# Patient Record
Sex: FEMALE | Race: WHITE | NOT HISPANIC OR LATINO | Employment: OTHER | URBAN - METROPOLITAN AREA
[De-identification: names, ages, dates, MRNs, and addresses within clinical notes are randomized per-mention and may not be internally consistent; named-entity substitution may affect disease eponyms.]

---

## 2017-01-31 ENCOUNTER — HISTORICAL (OUTPATIENT)
Dept: RADIOLOGY | Facility: HOSPITAL | Age: 67
End: 2017-01-31

## 2017-02-02 ENCOUNTER — HISTORICAL (OUTPATIENT)
Dept: ADMINISTRATIVE | Facility: HOSPITAL | Age: 67
End: 2017-02-02

## 2017-12-28 ENCOUNTER — HISTORICAL (OUTPATIENT)
Dept: HEMATOLOGY/ONCOLOGY | Facility: CLINIC | Age: 67
End: 2017-12-28

## 2017-12-28 LAB
ABS NEUT (OLG): 2.16 X10(3)/MCL (ref 2.1–9.2)
ALBUMIN SERPL-MCNC: 4 GM/DL (ref 3.4–5)
ALBUMIN/GLOB SERPL: 1.2 RATIO (ref 1.1–2)
ALP SERPL-CCNC: 70 UNIT/L (ref 38–126)
ALT SERPL-CCNC: 28 UNIT/L (ref 12–78)
AST SERPL-CCNC: 22 UNIT/L (ref 15–37)
BASOPHILS # BLD AUTO: 0 X10(3)/MCL (ref 0–0.2)
BASOPHILS NFR BLD AUTO: 0.9 %
BILIRUB SERPL-MCNC: 1.1 MG/DL (ref 0.2–1)
BILIRUBIN DIRECT+TOT PNL SERPL-MCNC: 0.2 MG/DL (ref 0–0.5)
BILIRUBIN DIRECT+TOT PNL SERPL-MCNC: 0.9 MG/DL (ref 0–0.8)
BUN SERPL-MCNC: 17 MG/DL (ref 7–18)
CALCIUM SERPL-MCNC: 9.3 MG/DL (ref 8.5–10.1)
CHLORIDE SERPL-SCNC: 103 MMOL/L (ref 98–107)
CO2 SERPL-SCNC: 29 MMOL/L (ref 21–32)
CREAT SERPL-MCNC: 0.72 MG/DL (ref 0.55–1.02)
EOSINOPHIL # BLD AUTO: 0.1 X10(3)/MCL (ref 0–0.9)
EOSINOPHIL NFR BLD AUTO: 2.2 %
ERYTHROCYTE [DISTWIDTH] IN BLOOD BY AUTOMATED COUNT: 13.4 % (ref 11.5–17)
GLOBULIN SER-MCNC: 3.4 GM/DL (ref 2.4–3.5)
GLUCOSE SERPL-MCNC: 88 MG/DL (ref 74–106)
HCT VFR BLD AUTO: 39.3 % (ref 37–47)
HGB BLD-MCNC: 12.9 GM/DL (ref 12–16)
LYMPHOCYTES # BLD AUTO: 1.8 X10(3)/MCL (ref 0.6–4.6)
LYMPHOCYTES NFR BLD AUTO: 39.5 %
MCH RBC QN AUTO: 28.2 PG (ref 27–31)
MCHC RBC AUTO-ENTMCNC: 32.8 GM/DL (ref 33–36)
MCV RBC AUTO: 85.8 FL (ref 80–94)
MONOCYTES # BLD AUTO: 0.4 X10(3)/MCL (ref 0.1–1.3)
MONOCYTES NFR BLD AUTO: 9.5 %
NEUTROPHILS # BLD AUTO: 2.2 X10(3)/MCL (ref 2.1–9.2)
NEUTROPHILS NFR BLD AUTO: 47.9 %
PLATELET # BLD AUTO: 156 X10(3)/MCL (ref 130–400)
PMV BLD AUTO: 8.5 FL (ref 9.4–12.4)
POTASSIUM SERPL-SCNC: 4 MMOL/L (ref 3.5–5.1)
PROT SERPL-MCNC: 7.4 GM/DL (ref 6.4–8.2)
RBC # BLD AUTO: 4.58 X10(6)/MCL (ref 4.2–5.4)
SODIUM SERPL-SCNC: 140 MMOL/L (ref 136–145)
WBC # SPEC AUTO: 4.5 X10(3)/MCL (ref 4.5–11.5)

## 2018-03-01 ENCOUNTER — HISTORICAL (OUTPATIENT)
Dept: ADMINISTRATIVE | Facility: HOSPITAL | Age: 68
End: 2018-03-01

## 2018-03-01 LAB
CHOLEST SERPL-MCNC: 233 MG/DL (ref 0–200)
CHOLEST/HDLC SERPL: 3.9 {RATIO} (ref 0–4)
EST. AVERAGE GLUCOSE BLD GHB EST-MCNC: 105 MG/DL
HBA1C MFR BLD: 5.3 % (ref 4.2–6.3)
HDLC SERPL-MCNC: 60 MG/DL (ref 35–60)
LDLC SERPL CALC-MCNC: 152 MG/DL (ref 0–129)
TRIGL SERPL-MCNC: 107 MG/DL (ref 30–150)
VLDLC SERPL CALC-MCNC: 21 MG/DL

## 2018-05-18 LAB — CRC RECOMMENDATION EXT: NORMAL

## 2019-02-14 ENCOUNTER — HISTORICAL (OUTPATIENT)
Dept: HEMATOLOGY/ONCOLOGY | Facility: CLINIC | Age: 69
End: 2019-02-14

## 2019-02-14 LAB
ABS NEUT (OLG): 2.75 X10(3)/MCL (ref 2.1–9.2)
ALBUMIN SERPL-MCNC: 3.7 GM/DL (ref 3.4–5)
ALBUMIN/GLOB SERPL: 1 RATIO (ref 1.1–2)
ALP SERPL-CCNC: 99 UNIT/L (ref 38–126)
ALT SERPL-CCNC: 44 UNIT/L (ref 12–78)
AST SERPL-CCNC: 26 UNIT/L (ref 15–37)
BASOPHILS # BLD AUTO: 0 X10(3)/MCL (ref 0–0.2)
BASOPHILS NFR BLD AUTO: 0.9 %
BILIRUB SERPL-MCNC: 0.8 MG/DL (ref 0.2–1)
BILIRUBIN DIRECT+TOT PNL SERPL-MCNC: 0.1 MG/DL (ref 0–0.5)
BILIRUBIN DIRECT+TOT PNL SERPL-MCNC: 0.7 MG/DL (ref 0–0.8)
BUN SERPL-MCNC: 17 MG/DL (ref 7–18)
CALCIUM SERPL-MCNC: 8.7 MG/DL (ref 8.5–10.1)
CHLORIDE SERPL-SCNC: 106 MMOL/L (ref 98–107)
CO2 SERPL-SCNC: 29 MMOL/L (ref 21–32)
CREAT SERPL-MCNC: 0.73 MG/DL (ref 0.55–1.02)
EOSINOPHIL # BLD AUTO: 0.1 X10(3)/MCL (ref 0–0.9)
EOSINOPHIL NFR BLD AUTO: 2.4 %
ERYTHROCYTE [DISTWIDTH] IN BLOOD BY AUTOMATED COUNT: 13 % (ref 11.5–17)
GLOBULIN SER-MCNC: 3.6 GM/DL (ref 2.4–3.5)
GLUCOSE SERPL-MCNC: 92 MG/DL (ref 74–106)
HCT VFR BLD AUTO: 39.9 % (ref 37–47)
HGB BLD-MCNC: 12.5 GM/DL (ref 12–16)
LYMPHOCYTES # BLD AUTO: 2 X10(3)/MCL (ref 0.6–4.6)
LYMPHOCYTES NFR BLD AUTO: 36.8 %
MCH RBC QN AUTO: 27.3 PG (ref 27–31)
MCHC RBC AUTO-ENTMCNC: 31.3 GM/DL (ref 33–36)
MCV RBC AUTO: 87.1 FL (ref 80–94)
MONOCYTES # BLD AUTO: 0.5 X10(3)/MCL (ref 0.1–1.3)
MONOCYTES NFR BLD AUTO: 8.6 %
NEUTROPHILS # BLD AUTO: 2.8 X10(3)/MCL (ref 2.1–9.2)
NEUTROPHILS NFR BLD AUTO: 51.1 %
PLATELET # BLD AUTO: 189 X10(3)/MCL (ref 130–400)
PMV BLD AUTO: 8.8 FL (ref 9.4–12.4)
POTASSIUM SERPL-SCNC: 4.3 MMOL/L (ref 3.5–5.1)
PROT SERPL-MCNC: 7.3 GM/DL (ref 6.4–8.2)
RBC # BLD AUTO: 4.58 X10(6)/MCL (ref 4.2–5.4)
SODIUM SERPL-SCNC: 141 MMOL/L (ref 136–145)
WBC # SPEC AUTO: 5.4 X10(3)/MCL (ref 4.5–11.5)

## 2019-06-27 ENCOUNTER — HISTORICAL (OUTPATIENT)
Dept: ADMINISTRATIVE | Facility: HOSPITAL | Age: 69
End: 2019-06-27

## 2019-06-27 LAB
CHOLEST SERPL-MCNC: 277 MG/DL (ref 0–200)
CHOLEST/HDLC SERPL: 4 {RATIO} (ref 0–4)
HDLC SERPL-MCNC: 69 MG/DL (ref 35–60)
LDLC SERPL CALC-MCNC: 180 MG/DL (ref 0–129)
TRIGL SERPL-MCNC: 142 MG/DL (ref 30–150)
VLDLC SERPL CALC-MCNC: 28 MG/DL

## 2020-06-03 ENCOUNTER — HISTORICAL (OUTPATIENT)
Dept: ADMINISTRATIVE | Facility: HOSPITAL | Age: 70
End: 2020-06-03

## 2020-07-14 ENCOUNTER — HISTORICAL (OUTPATIENT)
Dept: ADMINISTRATIVE | Facility: HOSPITAL | Age: 70
End: 2020-07-14

## 2020-07-14 LAB
ALBUMIN SERPL-MCNC: 4.3 GM/DL (ref 3.4–4.8)
ALBUMIN/GLOB SERPL: 1.3 RATIO (ref 1.1–2)
ALP SERPL-CCNC: 94 UNIT/L (ref 40–150)
ALT SERPL-CCNC: 25 UNIT/L (ref 0–55)
AST SERPL-CCNC: 20 UNIT/L (ref 5–34)
BILIRUB SERPL-MCNC: 1.3 MG/DL
BILIRUBIN DIRECT+TOT PNL SERPL-MCNC: 0.3 MG/DL (ref 0–0.5)
BILIRUBIN DIRECT+TOT PNL SERPL-MCNC: 1 MG/DL (ref 0–0.8)
BUN SERPL-MCNC: 12.5 MG/DL (ref 9.8–20.1)
CALCIUM SERPL-MCNC: 9.7 MG/DL (ref 8.4–10.2)
CHLORIDE SERPL-SCNC: 102 MMOL/L (ref 98–107)
CHOLEST SERPL-MCNC: 281 MG/DL
CHOLEST/HDLC SERPL: 5 {RATIO} (ref 0–5)
CO2 SERPL-SCNC: 30 MMOL/L (ref 23–31)
CREAT SERPL-MCNC: 0.78 MG/DL (ref 0.55–1.02)
GLOBULIN SER-MCNC: 3.4 GM/DL (ref 2.4–3.5)
GLUCOSE SERPL-MCNC: 98 MG/DL (ref 82–115)
HDLC SERPL-MCNC: 58 MG/DL (ref 35–60)
LDLC SERPL CALC-MCNC: 191 MG/DL (ref 50–140)
POTASSIUM SERPL-SCNC: 4.4 MMOL/L (ref 3.5–5.1)
PROT SERPL-MCNC: 7.7 GM/DL (ref 5.8–7.6)
SODIUM SERPL-SCNC: 141 MMOL/L (ref 136–145)
TRIGL SERPL-MCNC: 160 MG/DL (ref 37–140)
VLDLC SERPL CALC-MCNC: 32 MG/DL

## 2020-07-30 ENCOUNTER — HISTORICAL (OUTPATIENT)
Dept: RADIOLOGY | Facility: HOSPITAL | Age: 70
End: 2020-07-30

## 2020-07-30 LAB — BMD RECOMMENDATION EXT: NORMAL

## 2020-08-13 ENCOUNTER — HISTORICAL (OUTPATIENT)
Dept: RADIOLOGY | Facility: HOSPITAL | Age: 70
End: 2020-08-13

## 2020-08-18 ENCOUNTER — HISTORICAL (OUTPATIENT)
Dept: ADMINISTRATIVE | Facility: HOSPITAL | Age: 70
End: 2020-08-18

## 2020-08-18 LAB
ABS NEUT (OLG): 3.63 X10(3)/MCL (ref 2.1–9.2)
ALBUMIN SERPL-MCNC: 4.2 GM/DL (ref 3.4–4.8)
ALBUMIN/GLOB SERPL: 1.3 RATIO (ref 1.1–2)
ALP SERPL-CCNC: 101 UNIT/L (ref 40–150)
ALT SERPL-CCNC: 29 UNIT/L (ref 0–55)
AST SERPL-CCNC: 28 UNIT/L (ref 5–34)
BASOPHILS # BLD AUTO: 0 X10(3)/MCL (ref 0–0.2)
BASOPHILS NFR BLD AUTO: 0.6 %
BILIRUB SERPL-MCNC: 1.4 MG/DL
BILIRUBIN DIRECT+TOT PNL SERPL-MCNC: 0.5 MG/DL (ref 0–0.5)
BILIRUBIN DIRECT+TOT PNL SERPL-MCNC: 0.9 MG/DL (ref 0–0.8)
BUN SERPL-MCNC: 12.9 MG/DL (ref 9.8–20.1)
CALCIUM SERPL-MCNC: 9 MG/DL (ref 8.4–10.2)
CHLORIDE SERPL-SCNC: 103 MMOL/L (ref 98–107)
CO2 SERPL-SCNC: 27 MMOL/L (ref 23–31)
CREAT SERPL-MCNC: 0.78 MG/DL (ref 0.55–1.02)
EOSINOPHIL # BLD AUTO: 0.1 X10(3)/MCL (ref 0–0.9)
EOSINOPHIL NFR BLD AUTO: 1.7 %
ERYTHROCYTE [DISTWIDTH] IN BLOOD BY AUTOMATED COUNT: 13.2 % (ref 11.5–17)
GLOBULIN SER-MCNC: 3.2 GM/DL (ref 2.4–3.5)
GLUCOSE SERPL-MCNC: 93 MG/DL (ref 82–115)
HCT VFR BLD AUTO: 40.8 % (ref 37–47)
HGB BLD-MCNC: 12.8 GM/DL (ref 12–16)
LYMPHOCYTES # BLD AUTO: 2.3 X10(3)/MCL (ref 0.6–4.6)
LYMPHOCYTES NFR BLD AUTO: 35.2 %
MCH RBC QN AUTO: 26.8 PG (ref 27–31)
MCHC RBC AUTO-ENTMCNC: 31.4 GM/DL (ref 33–36)
MCV RBC AUTO: 85.5 FL (ref 80–94)
MONOCYTES # BLD AUTO: 0.5 X10(3)/MCL (ref 0.1–1.3)
MONOCYTES NFR BLD AUTO: 7.2 %
NEUTROPHILS # BLD AUTO: 3.6 X10(3)/MCL (ref 2.1–9.2)
NEUTROPHILS NFR BLD AUTO: 55.1 %
PLATELET # BLD AUTO: 189 X10(3)/MCL (ref 130–400)
PMV BLD AUTO: 8.8 FL (ref 9.4–12.4)
POTASSIUM SERPL-SCNC: 4.5 MMOL/L (ref 3.5–5.1)
PROT SERPL-MCNC: 7.4 GM/DL (ref 5.8–7.6)
RBC # BLD AUTO: 4.77 X10(6)/MCL (ref 4.2–5.4)
SODIUM SERPL-SCNC: 140 MMOL/L (ref 136–145)
WBC # SPEC AUTO: 6.6 X10(3)/MCL (ref 4.5–11.5)

## 2020-10-27 ENCOUNTER — HISTORICAL (OUTPATIENT)
Dept: ADMINISTRATIVE | Facility: HOSPITAL | Age: 70
End: 2020-10-27

## 2020-10-27 LAB
ALBUMIN SERPL-MCNC: 4.2 GM/DL (ref 3.4–4.8)
ALP SERPL-CCNC: 72 UNIT/L (ref 40–150)
ALT SERPL-CCNC: 18 UNIT/L (ref 0–55)
AST SERPL-CCNC: 22 UNIT/L (ref 5–34)
BILIRUB SERPL-MCNC: 1.3 MG/DL
BILIRUBIN DIRECT+TOT PNL SERPL-MCNC: 0.5 MG/DL (ref 0–0.5)
BILIRUBIN DIRECT+TOT PNL SERPL-MCNC: 0.8 MG/DL (ref 0–0.8)
CHOLEST SERPL-MCNC: 141 MG/DL
CHOLEST/HDLC SERPL: 2 {RATIO} (ref 0–5)
HDLC SERPL-MCNC: 62 MG/DL (ref 35–60)
LDLC SERPL CALC-MCNC: 61 MG/DL (ref 50–140)
LIVER PROFILE INTERP: NORMAL
PROT SERPL-MCNC: 7.3 GM/DL (ref 5.8–7.6)
TRIGL SERPL-MCNC: 92 MG/DL (ref 37–140)
VLDLC SERPL CALC-MCNC: 18 MG/DL

## 2021-05-17 ENCOUNTER — HISTORICAL (OUTPATIENT)
Dept: ADMINISTRATIVE | Facility: HOSPITAL | Age: 71
End: 2021-05-17

## 2021-07-15 ENCOUNTER — HISTORICAL (OUTPATIENT)
Dept: ADMINISTRATIVE | Facility: HOSPITAL | Age: 71
End: 2021-07-15

## 2021-07-15 LAB
ALBUMIN SERPL-MCNC: 3.7 GM/DL (ref 3.4–4.8)
ALBUMIN/GLOB SERPL: 1.2 RATIO (ref 1.1–2)
ALP SERPL-CCNC: 104 UNIT/L (ref 40–150)
ALT SERPL-CCNC: 19 UNIT/L (ref 0–55)
AST SERPL-CCNC: 21 UNIT/L (ref 5–34)
BILIRUB SERPL-MCNC: 1.2 MG/DL
BILIRUBIN DIRECT+TOT PNL SERPL-MCNC: 0.4 MG/DL (ref 0–0.5)
BILIRUBIN DIRECT+TOT PNL SERPL-MCNC: 0.8 MG/DL (ref 0–0.8)
BUN SERPL-MCNC: 17.8 MG/DL (ref 9.8–20.1)
CALCIUM SERPL-MCNC: 9.2 MG/DL (ref 8.4–10.2)
CHLORIDE SERPL-SCNC: 103 MMOL/L (ref 98–107)
CHOLEST SERPL-MCNC: 160 MG/DL
CHOLEST/HDLC SERPL: 2 {RATIO} (ref 0–5)
CO2 SERPL-SCNC: 31 MMOL/L (ref 23–31)
CREAT SERPL-MCNC: 0.79 MG/DL (ref 0.55–1.02)
GLOBULIN SER-MCNC: 3.2 GM/DL (ref 2.4–3.5)
GLUCOSE SERPL-MCNC: 90 MG/DL (ref 82–115)
HDLC SERPL-MCNC: 68 MG/DL (ref 35–60)
LDLC SERPL CALC-MCNC: 78 MG/DL (ref 50–140)
POTASSIUM SERPL-SCNC: 4.5 MMOL/L (ref 3.5–5.1)
PROT SERPL-MCNC: 6.9 GM/DL (ref 5.8–7.6)
SODIUM SERPL-SCNC: 144 MMOL/L (ref 136–145)
TRIGL SERPL-MCNC: 72 MG/DL (ref 37–140)
VLDLC SERPL CALC-MCNC: 14 MG/DL

## 2021-08-11 ENCOUNTER — HISTORICAL (OUTPATIENT)
Dept: HEMATOLOGY/ONCOLOGY | Facility: CLINIC | Age: 71
End: 2021-08-11

## 2021-08-11 LAB
ABS NEUT (OLG): 3.06 X10(3)/MCL (ref 2.1–9.2)
ALBUMIN SERPL-MCNC: 4.2 GM/DL (ref 3.4–4.8)
ALBUMIN/GLOB SERPL: 1.4 RATIO (ref 1.1–2)
ALP SERPL-CCNC: 125 UNIT/L (ref 40–150)
ALT SERPL-CCNC: 27 UNIT/L (ref 0–55)
AST SERPL-CCNC: 24 UNIT/L (ref 5–34)
BASOPHILS # BLD AUTO: 0 X10(3)/MCL (ref 0–0.2)
BASOPHILS NFR BLD AUTO: 0.8 %
BILIRUB SERPL-MCNC: 1.2 MG/DL
BILIRUBIN DIRECT+TOT PNL SERPL-MCNC: 0.4 MG/DL (ref 0–0.5)
BILIRUBIN DIRECT+TOT PNL SERPL-MCNC: 0.8 MG/DL (ref 0–0.8)
BUN SERPL-MCNC: 17.8 MG/DL (ref 9.8–20.1)
CALCIUM SERPL-MCNC: 9.9 MG/DL (ref 8.4–10.2)
CHLORIDE SERPL-SCNC: 102 MMOL/L (ref 98–107)
CO2 SERPL-SCNC: 29 MMOL/L (ref 23–31)
CREAT SERPL-MCNC: 0.76 MG/DL (ref 0.55–1.02)
EOSINOPHIL # BLD AUTO: 0.1 X10(3)/MCL (ref 0–0.9)
EOSINOPHIL NFR BLD AUTO: 2.3 %
ERYTHROCYTE [DISTWIDTH] IN BLOOD BY AUTOMATED COUNT: 13.2 % (ref 11.5–17)
GLOBULIN SER-MCNC: 3.1 GM/DL (ref 2.4–3.5)
GLUCOSE SERPL-MCNC: 68 MG/DL (ref 82–115)
HCT VFR BLD AUTO: 39.7 % (ref 37–47)
HGB BLD-MCNC: 12.5 GM/DL (ref 12–16)
LYMPHOCYTES # BLD AUTO: 2.2 X10(3)/MCL (ref 0.6–4.6)
LYMPHOCYTES NFR BLD AUTO: 36.5 %
MCH RBC QN AUTO: 27.1 PG (ref 27–31)
MCHC RBC AUTO-ENTMCNC: 31.5 GM/DL (ref 33–36)
MCV RBC AUTO: 85.9 FL (ref 80–94)
MONOCYTES # BLD AUTO: 0.7 X10(3)/MCL (ref 0.1–1.3)
MONOCYTES NFR BLD AUTO: 10.7 %
NEUTROPHILS # BLD AUTO: 3.1 X10(3)/MCL (ref 2.1–9.2)
NEUTROPHILS NFR BLD AUTO: 49.5 %
PLATELET # BLD AUTO: 170 X10(3)/MCL (ref 130–400)
PMV BLD AUTO: 8.6 FL (ref 9.4–12.4)
POTASSIUM SERPL-SCNC: 4.7 MMOL/L (ref 3.5–5.1)
PROT SERPL-MCNC: 7.3 GM/DL (ref 5.8–7.6)
RBC # BLD AUTO: 4.62 X10(6)/MCL (ref 4.2–5.4)
SODIUM SERPL-SCNC: 143 MMOL/L (ref 136–145)
WBC # SPEC AUTO: 6.2 X10(3)/MCL (ref 4.5–11.5)

## 2021-08-16 ENCOUNTER — HISTORICAL (OUTPATIENT)
Dept: RADIOLOGY | Facility: HOSPITAL | Age: 71
End: 2021-08-16

## 2022-04-10 ENCOUNTER — HISTORICAL (OUTPATIENT)
Dept: ADMINISTRATIVE | Facility: HOSPITAL | Age: 72
End: 2022-04-10
Payer: MEDICARE

## 2022-04-27 VITALS
HEIGHT: 60 IN | SYSTOLIC BLOOD PRESSURE: 128 MMHG | DIASTOLIC BLOOD PRESSURE: 82 MMHG | WEIGHT: 189 LBS | BODY MASS INDEX: 37.11 KG/M2 | OXYGEN SATURATION: 96 %

## 2022-05-03 NOTE — HISTORICAL [facility]
This is a historical note converted from Adi. Formatting and pictures may have been removed.  Please reference Adi for original formatting and attached multimedia. Chief Complaint  Fell about 1 month ago on her left knee and the pain has incresed since then.  History of Present Illness  71-year-old female presents office today for evaluation of her left knee pain. ?This is been?present for nearly 1 month. ?She states that she fell but cannot?quite remember?how.? Her pain is medial sided. ?It is worse with?deep flexion?and bending.? She denies any locking, catching, giving way episodes. ?She denies any swelling.? She has tried oral prednisone with no relief?but has seen some relief with?diclofenac.  Review of Systems  Systemic: No fever, no chills, and no recent weight change.  Head: No headache - frequent.  Eyes: No vision problems.  Otolarnygeal: No hearing loss, no earache, no epistaxis, no hoarseness, and no tooth pain. Gums normal.  Cardiovascular: No chest pain or discomfort and no palpitations.  Pulmonary: No pulmonary symptoms - no dyspnea, no shortness of breath  Gastrointestinal: Appetite not decreased. No dysphagia and no constant eructation. No nausea, no vomiting, no abdominal pain, no hematochezia.  Genitourinary: No genitourinary symptoms - No urinary hesitancy. No urinary loss of control - no burning sensation during urination.  Musculoskeletal: No calf muscle cramps and no localized soft tissue swelling  Neurological: No fainting and no convulsions.  Psychological: no depression.  Skin: No rash.  Physical Exam  Vitals & Measurements  T:?36.1? ?C (Oral)? HR:?78(Peripheral)? BP:?136/85?  HT:?153.00?cm? WT:?83.640?kg? BMI:?35.73?  PHYSICAL FINDINGS  Cardiovascular:  Arterial Pulses: Posterior tibialis pulses were normal. Dorsalis pedis pulses were normal left.  Musculoskeletal System:  Thigh:  ?Thigh: Thigh showed quadriceps atrophy.  Knee:  left?Knee:  Grade effusion 1  Genu varum. Patella  demonstrated crepitus. Anteromedial aspect was tender on palpation. Medial aspect was tender on palpation. Medial collateral ligament was tender on palpation. Active motion.  left?Knee:  Knee Motion: Value  Active flexion?120 degrees  Active extension?5 degrees  Pain was elicited by flexion. No erythema. No warmth. No medial instability. No lateral instability. No one plane medial (straight) instability. No one plane lateral (straight) instability. A Lachman test did not demonstrate one plane anterior instability.  Neurological:  Gait And Stance: A left-sided antalgic gait was observed.  ???  ???  TESTS  Imaging:  X-Ray Knee:  A complete knee x-ray with standing views was performed -of?left knee.  AP and lateral view x-rays of the Right knee with sunrise view of the patella were performed -of?left knee.  ???  ???  IMPRESSIONS RADIOLOGY TEST  Narrowing of the joint space bone on bone in patellofemoral compartment, and osteophytes arising from the?left knee. narrowing of medial compartment as well  Kellgren Andrea grade 4 changes  ?   After verbal consent?left knee was prepped in sterile fashion. 2 mL of lidocaine and 2 mL of betamethasone was injected intra-articularly on a 25-gauge needle. Patient tolerated the procedure well.  Assessment/Plan  1.?Primary osteoarthritis of left knee?M17.12  ?Corticosteroid injection, physical therapy, continue diclofenac. ?She will follow-up with us as needed  Ordered:  betamethasone, 12 mg, Intra-Articular, Once, first dose 05/17/21 17:00:00 CDT, stop date 05/17/21 17:00:00 CDT  Lidocaine inj., 2 mL, Intra-Articular, Once, first dose 05/17/21 16:01:00 CDT, stop date 05/17/21 16:01:00 CDT  asp/inj jnt/bursa, major [id number] PC, 05/17/21 16:01:00 CDT, [facility], Routine, 05/17/21 16:01:00 CDT, Primary osteoarthritis of left knee  Clinic Follow-up PRN, 05/17/21 16:00:00 CDT, Future Order, [facility]  Office/Outpatient Visit Level 3 Established [id number] PC, Primary  osteoarthritis of left knee, [facility], 05/17/21 16:00:00 CDT  PT/OT External Referral, 05/17/21 16:01:00 CDT, Primary osteoarthritis of left knee, Anit Grav Hip Abductor & Extensor Exc.  Isotonic hamstring & Closed Chain Quad  Stretch and Strengthen  No Dry Needling  Therapeutic Excercise, 3 X Week, ****KNEE PT ORDERS****  ?  Referrals  Clinic Follow-up PRN, 05/17/21 16:00:00 CDT, Future Order, [facility]  PT/OT External Referral, 05/17/21 16:01:00 CDT, Primary osteoarthritis of left knee, Anit Grav Hip Abductor & Extensor Exc.  Isotonic hamstring & Closed Chain Quad  Stretch and Strengthen  No Dry Needling  Therapeutic Excercise, 3 X Week, ****KNEE PT ORDERS****   Problem List/Past Medical History  Ongoing  Anxiety  History of breast cancer  Hyperlipidemia  Obesity  Osteopenia  Primary osteoarthritis of left knee  Restless leg syndrome  Historical  Breast cancer  Depression  Dyspareunia  Procedure/Surgical History  Colonoscopy, flexible, proximal to splenic flexure; with removal of tumor(s), polyp(s), or other lesion(s) by snare technique. (03/12/2013)  Endoscopic polypectomy of large intestine (03/12/2013)  Breast lumpectomy  Breast reconstruction  Mastectomy  wisdom teeth extraction   Medications  Aleve 220 mg oral tablet, 1 cap(s), Oral, q8hr, PRN  aspirin 81 mg oral tablet, 81 mg= 1 tab(s), Oral, Daily  atorvastatin 20 mg oral tablet, 20 mg= 1 tab(s), Oral, At Bedtime, 3 refills  Celestone, 12 mg, Intra-Articular, Once  lidocaine 2% injectable solution, 2 mL, Intra-Articular, Once  Xanax 0.5 mg oral tablet, 0.5 mg= 1 tab(s), Oral, TID, PRN, 5 refills  Allergies  ciprofloxacin  Social History  Abuse/Neglect  No, 05/17/2021  Alcohol - Denies Alcohol Use, 03/07/2013  Never, 04/15/2015  Employment/School  Work/School description: .. Highest education level: Post graduate degree(s)., 04/15/2015  Retired, 08/20/2014  Exercise  Exercise frequency: 1-2 times/week. Exercise type:  dancing., 04/15/2015  Home/Environment  Lives with Spouse., 08/20/2014  Nutrition/Health  Regular, 04/15/2015  Sexual  Sexually active: Yes., 04/15/2015  Substance Use - Denies Substance Abuse, 08/20/2014  Never, 04/15/2015  Tobacco - Denies Tobacco Use, 03/07/2013  Never (less than 100 in lifetime), N/A, 05/17/2021  Family History  Alzheimers disease: Mother.  Cancer....: Sister.  Hypertension.: Father.  Lymphoma....: Father.  Primary malignant neoplasm of breast: Other.  Immunizations  Vaccine Date Status Comments   COVID-19 MRNA, LNP-S, PF- Pfizer 03/08/2021 Given    COVID-19 MRNA, LNP-S, PF- Pfizer 02/12/2021 Given    influenza virus vaccine, inactivated 10/22/2020 Given    influenza virus vaccine, inactivated 11/18/2019 Given    pneumococcal 23-polyvalent vaccine 07/11/2019 Given    influenza virus vaccine, inactivated 11/15/2018 Given    influenza virus vaccine, inactivated 11/09/2017 Given pt. tolerated well. No c/o or concerns a this time.   influenza virus vaccine, inactivated 12/15/2016 Given    pneumococcal 13-valent conjugate vaccine 04/06/2016 Given    influenza virus vaccine, inactivated 11/01/2015 Recorded    zoster vaccine live 2012 Recorded    tetanus/diphtheria/pertussis, acel(Tdap) 09/01/2005 Recorded    Health Maintenance  Health Maintenance  ???Pending?(in the next year)  ??? ??OverDue  ??? ? ? ?Tetanus Vaccine due??09/09/18??and every 10??year(s)  ??? ? ? ?Advance Directive due??01/02/21??and every 1??year(s)  ??? ? ? ?Alcohol Misuse Screening due??01/02/21??and every 1??year(s)  ??? ? ? ?Cognitive Screening due??01/02/21??and every 1??year(s)  ??? ? ? ?Functional Assessment due??01/02/21??and every 1??year(s)  ??? ??Due?  ??? ? ? ?Zoster Vaccine due??05/17/21??Unknown Frequency  ??? ??Due In Future?  ??? ? ? ?ADL Screening not due until??07/16/21??and every 1??year(s)  ??? ? ? ?Aspirin Therapy for CVD Prevention not due until??07/16/21??and every 1??year(s)  ??? ? ? ?Medicare Annual Wellness  Exam not due until??07/16/21??and every 1??year(s)  ??? ? ? ?Blood Pressure Screening not due until??08/18/21??and every 1??year(s)  ??? ? ? ?Body Mass Index Check not due until??08/18/21??and every 1??year(s)  ??? ? ? ?Depression Screening not due until??08/18/21??and every 1??year(s)  ??? ? ? ?Influenza Vaccine not due until??10/01/21??and every 1??day(s)  ??? ? ? ?Obesity Screening not due until??01/01/22??and every 1??year(s)  ??? ? ? ?Fall Risk Assessment not due until??01/02/22??and every 1??year(s)  ???Satisfied?(in the past 1 year)  ??? ??Satisfied?  ??? ? ? ?ADL Screening on??07/16/20.??Satisfied by Aislinn Loredo LPN P.  ??? ? ? ?Advance Directive on??08/18/20.??Satisfied by Rossi Lea  ??? ? ? ?Alcohol Misuse Screening on??07/16/20.??Satisfied by Aislinn Loredo LPN P.  ??? ? ? ?Aspirin Therapy for CVD Prevention on??07/16/20.??Satisfied by Aislinn Loredo LPN P.  ??? ? ? ?Blood Pressure Screening on??05/17/21.??Satisfied by Pierre BOJORQUEZ Radha K.  ??? ? ? ?Body Mass Index Check on??05/17/21.??Satisfied by Pierre BOJORQUEZ Radha K.  ??? ? ? ?Bone Density Screening on??07/30/20.  ??? ? ? ?Breast Cancer Screening on??08/13/20.??Satisfied by Get ?Lindy FRANKLIN  ??? ? ? ?Depression Screening on??08/18/20.??Satisfied by Rossi Lea  ??? ? ? ?Diabetes Screening on??08/18/20.??Satisfied by Katia Ochoa MT  ??? ? ? ?Fall Risk Assessment on??05/17/21.??Satisfied by Radha Jay LPN  ??? ? ? ?Functional Assessment on??07/16/20.??Satisfied by Aislinn Loredo LPN.  ??? ? ? ?Influenza Vaccine on??10/22/20.??Satisfied by Carlos Rogel NP P.  ??? ? ? ?Lipid Screening on??10/27/20.??Satisfied by Eduin West  ??? ? ? ?Medicare Annual Wellness Exam on??07/16/20.??Satisfied by Nilam Tavares MD  ??? ? ? ?Obesity Screening on??05/17/21.??Satisfied by Radha Jay LPN  ?

## 2022-05-03 NOTE — HISTORICAL [facility]
This is a historical note converted from Adi. Formatting and pictures may have been removed.  Please reference [facility] for original formatting and attached multimedia. Chief Complaint  Pt is here for right knee pain. Pt stated the pain has been going on since September. Pt stated she was dancing and her knee hurt after one of the lessons. Pt stated she has been taking aleve to help with the pain.  History of Present Illness  70-year-old female presents office today for evaluation of her right knee pain.? This is been intermittent in nature and going on since?September last year. ?She likes to dance and she was?at 1 of her lessons when she noted some medial sided pain.? This was relieved with taking over-the-counter Aleve.? She denies any swelling, catching, locking or giving way episodes. ?Her pain comes and goes with?certain activities but overall?resolves.? Currently today she has no pain.  Review of Systems  Systemic: No fever, no chills, and no recent weight change.  Head: No headache - frequent.  Eyes: No vision problems.  Otolarnygeal: No hearing loss, no earache, no epistaxis, no hoarseness, and no tooth pain. Gums normal.  Cardiovascular: No chest pain or discomfort and no palpitations.  Pulmonary: No pulmonary symptoms - no dyspnea, no shortness of breath  Gastrointestinal: Appetite not decreased. No dysphagia and no constant eructation. No nausea, no vomiting, no abdominal pain, no hematochezia.  Genitourinary: No genitourinary symptoms - No urinary hesitancy. No urinary loss of control - no burning sensation during urination.  Musculoskeletal: No calf muscle cramps and no localized soft tissue swelling  Neurological: No fainting and no convulsions.  Psychological: no depression.  Skin: No rash.  Physical Exam  Vitals & Measurements  T:?36.8? ?C (Oral)? HR:?80(Peripheral)? BP:?148/76?  HT:?153?cm? WT:?87.54?kg? BMI:?37.4?  PHYSICAL FINDINGS  Cardiovascular:  Arterial Pulses: Posterior tibialis pulses  were normal. Dorsalis pedis pulses were normal right .  Musculoskeletal System:  Thigh:  ?Thigh: Thigh showed quadriceps atrophy.  Knee:  right?Knee:  Grade effusion 1  Genu varum. Patella demonstrated crepitus. Anteromedial aspect was tender on palpation. Medial aspect was tender on palpation. Medial collateral ligament was tender on palpation. Active motion.  right?Knee:  Knee Motion: Value  Active flexion?120 degrees  Active extension?5 degrees  Pain was elicited by flexion. No erythema. No warmth. No medial instability. No lateral instability. No one plane medial (straight) instability. No one plane lateral (straight) instability. A Lachman test did not demonstrate one plane anterior instability.  Neurological:  Gait And Stance: A right-sided antalgic gait was observed.  ???  ???  TESTS  Imaging:  X-Ray Knee:  A complete knee x-ray with standing views was performed -of?right knee.  AP and lateral view x-rays of the Right knee with sunrise view of the patella were performed -of?right knee.  ???  ???  IMPRESSIONS RADIOLOGY TEST  Narrowing of the joint space in medial and patellofemoral compartments, and osteophytes arising from the?right knee.  ?   Kellgren Andrea grade 3 changes  Assessment/Plan  1.?Osteoarthritis of right knee?M17.11  ?Discussed with Sarah that she has moderate osteoarthritis of the right knee.? We discussed conservative treatment consisting of?corticosteroid injections, over-the-counter Aleve home exercise program versus physical therapy.? Currently she is in?no significant pain so she would like to avoid any injection and continue with her Aleve and home exercise program she will contact us?in the future if she would like an injection or physical therapy program.  Orders:  Clinic Follow-up PRN, 06/03/20 8:52:00 CDT, Future Order, [facility]  Referrals  Clinic Follow-up PRN, 06/03/20 8:52:00 CDT, Future Order, [facility]   Problem List/Past Medical History  Ongoing  Anxiety  History  of breast cancer  Hyperlipidemia  Obesity  Osteopenia  Restless leg syndrome  Historical  Breast cancer  Depression  Dyspareunia  Procedure/Surgical History  Colonoscopy, flexible, proximal to splenic flexure; with removal of tumor(s), polyp(s), or other lesion(s) by snare technique. (03/12/2013)  Endoscopic polypectomy of large intestine (03/12/2013)  Breast lumpectomy  Breast reconstruction  Mastectomy  wisdom teeth extraction   Medications  Aleve 220 mg oral tablet, 1 cap(s), Oral, q8hr, PRN  aspirin 81 mg oral tablet, 81 mg= 1 tab(s), Oral, Daily,? ?Not taking  gabapentin 600 mg oral tablet, 600 mg= 1 tab(s), Oral, BID, 3 refills,? ?Not taking  Requip 0.5 mg oral tablet, 0.5 mg= 1 tab(s), Oral, qPM, 3 refills,? ?Not taking  Xanax 0.5 mg oral tablet, 0.5 mg= 1 tab(s), Oral, TID, PRN, 5 refills  Allergies  ciprofloxacin  Social History  Abuse/Neglect  No, 06/03/2020  Alcohol - Denies Alcohol Use, 03/07/2013  Never, 04/15/2015  Employment/School  Work/School description: .. Highest education level: Post graduate degree(s)., 04/15/2015  Retired, 08/20/2014  Exercise  Exercise frequency: 1-2 times/week. Exercise type: dancing., 04/15/2015  Home/Environment  Lives with Spouse., 08/20/2014  Nutrition/Health  Regular, 04/15/2015  Sexual  Sexually active: Yes., 04/15/2015  Substance Use - Denies Substance Abuse, 08/20/2014  Never, 04/15/2015  Tobacco - Denies Tobacco Use, 03/07/2013  Never (less than 100 in lifetime), N/A, 06/03/2020  Family History  Alzheimers disease: Mother.  Cancer....: Sister.  Hypertension.: Father.  Lymphoma....: Father.  Primary malignant neoplasm of breast: Other.  Immunizations  Vaccine Date Status Comments   influenza virus vaccine, inactivated 11/18/2019 Given    pneumococcal 23-polyvalent vaccine 07/11/2019 Given    influenza virus vaccine, inactivated 11/15/2018 Given    influenza virus vaccine, inactivated 11/09/2017 Given pt. tolerated well. No c/o or concerns a this time.    influenza virus vaccine, inactivated 12/15/2016 Given    pneumococcal 13-valent conjugate vaccine 04/06/2016 Given    influenza virus vaccine, inactivated 11/01/2015 Recorded    zoster vaccine live 2012 Recorded    tetanus/diphtheria/pertussis, acel(Tdap) 09/01/2005 Recorded    Health Maintenance  Health Maintenance  ???Pending?(in the next year)  ??? ??OverDue  ??? ? ? ?Pneumococcal Vaccine due??and every?  ??? ? ? ?Aspirin Therapy for CVD Prevention due??02/09/18??and every 1??year(s)  ??? ? ? ?Tetanus Vaccine due??09/09/18??and every 10??year(s)  ??? ? ? ?Bone Density Screening due??01/31/19??and every 2??year(s)  ??? ? ? ?Advance Directive due??01/01/20??and every 1??year(s)  ??? ? ? ?Alcohol Misuse Screening due??01/01/20??and every 1??year(s)  ??? ??Near Due?  ??? ? ? ?Medicare Annual Wellness Exam near due??07/11/20??and every 1??year(s)  ??? ??Due In Future?  ??? ? ? ?ADL Screening not due until??07/11/20??and every 1??year(s)  ??? ? ? ?Cognitive Screening not due until??01/01/21??and every 1??year(s)  ??? ? ? ?Fall Risk Assessment not due until??01/01/21??and every 1??year(s)  ??? ? ? ?Functional Assessment not due until??01/01/21??and every 1??year(s)  ??? ? ? ?Obesity Screening not due until??01/01/21??and every 1??year(s)  ??? ? ? ?Breast Cancer Screening (Senior Wellness) not due until??02/20/21??and every 2??year(s)  ???Satisfied?(in the past 1 year)  ??? ??Satisfied?  ??? ? ? ?ADL Screening on??07/11/19.??Satisfied by Aislinn Loredo LPN  ??? ? ? ?Alcohol Misuse Screening on??07/11/19.??Satisfied by Aislinn Loredo LPN  ??? ? ? ?Blood Pressure Screening on??06/03/20.??Satisfied by Radha Vee LPN  ??? ? ? ?Body Mass Index Check on??06/03/20.??Satisfied by Radha Vee LPN  ??? ? ? ?Depression Screening on??06/03/20.??Satisfied by Radha Vee LPN  ??? ? ? ?Fall Risk Assessment on??06/03/20.??Satisfied by Radha Vee LPN  ??? ? ? ?Functional Assessment on??07/11/19.??Satisfied by Gertrude  LPN, Aislinn P.  ??? ? ? ?Influenza Vaccine on??11/18/19.??Satisfied by Jenelle Mcrae  ??? ? ? ?Lipid Screening on??06/27/19.??Satisfied by LeJeune, Stephanie A.  ??? ? ? ?Medicare Annual Wellness Exam on??07/11/19.??Satisfied by Nilam Tavares MD  ??? ? ? ?Obesity Screening on??06/03/20.??Satisfied by Radha Vee LPN  ??? ? ? ?Pneumococcal Vaccine on??07/11/19.??Satisfied by Aislinn Loredo LPN  ?

## 2022-06-09 DIAGNOSIS — Z12.31 SCREENING MAMMOGRAM FOR HIGH-RISK PATIENT: Primary | ICD-10-CM

## 2022-06-09 DIAGNOSIS — Z85.3 HISTORY OF BREAST CANCER: ICD-10-CM

## 2022-07-28 ENCOUNTER — TELEPHONE (OUTPATIENT)
Dept: INTERNAL MEDICINE | Facility: CLINIC | Age: 72
End: 2022-07-28
Payer: MEDICARE

## 2022-07-28 DIAGNOSIS — Z00.00 WELLNESS EXAMINATION: Primary | ICD-10-CM

## 2022-07-28 DIAGNOSIS — E78.5 HYPERLIPIDEMIA, UNSPECIFIED HYPERLIPIDEMIA TYPE: ICD-10-CM

## 2022-07-28 NOTE — TELEPHONE ENCOUNTER
----- Message from Cheryl Lejeune sent at 7/28/2022 10:53 AM CDT -----  Regarding: RE: NELSON Tavares 8/4/22 @1040  Left vm regarding OV, labs, and check in protocol.   ----- Message -----  From: [place] Hand LPN  Sent: 7/28/2022   9:37 AM CDT  To: Cheryl Lejeune  Subject: NELSON Tavares 8/4/22 @1040                         Are there any outstanding tasks in the chart? Pt will need fasting labs    Is there any documentation of tasks? No    Has patient seen another physician, been to the ER, [facility], or admitted to hospital since last visit?    Has the patient done blood work or imaging since last visit?

## 2022-07-29 ENCOUNTER — LAB VISIT (OUTPATIENT)
Dept: LAB | Facility: HOSPITAL | Age: 72
End: 2022-07-29
Attending: INTERNAL MEDICINE
Payer: MEDICARE

## 2022-07-29 DIAGNOSIS — Z00.00 WELLNESS EXAMINATION: ICD-10-CM

## 2022-07-29 DIAGNOSIS — E78.5 HYPERLIPIDEMIA, UNSPECIFIED HYPERLIPIDEMIA TYPE: ICD-10-CM

## 2022-07-29 LAB
ALBUMIN SERPL-MCNC: 3.9 GM/DL (ref 3.4–4.8)
ALBUMIN/GLOB SERPL: 1.3 RATIO (ref 1.1–2)
ALP SERPL-CCNC: 101 UNIT/L (ref 40–150)
ALT SERPL-CCNC: 19 UNIT/L (ref 0–55)
AST SERPL-CCNC: 20 UNIT/L (ref 5–34)
BASOPHILS # BLD AUTO: 0.06 X10(3)/MCL (ref 0–0.2)
BASOPHILS NFR BLD AUTO: 1.3 %
BILIRUBIN DIRECT+TOT PNL SERPL-MCNC: 1.2 MG/DL
BUN SERPL-MCNC: 12.3 MG/DL (ref 9.8–20.1)
CALCIUM SERPL-MCNC: 9.5 MG/DL (ref 8.4–10.2)
CHLORIDE SERPL-SCNC: 107 MMOL/L (ref 98–107)
CHOLEST SERPL-MCNC: 163 MG/DL
CHOLEST/HDLC SERPL: 3 {RATIO} (ref 0–5)
CO2 SERPL-SCNC: 27 MMOL/L (ref 23–31)
CREAT SERPL-MCNC: 0.72 MG/DL (ref 0.55–1.02)
EOSINOPHIL # BLD AUTO: 0.13 X10(3)/MCL (ref 0–0.9)
EOSINOPHIL NFR BLD AUTO: 2.8 %
ERYTHROCYTE [DISTWIDTH] IN BLOOD BY AUTOMATED COUNT: 13.2 % (ref 11.5–17)
GLOBULIN SER-MCNC: 2.9 GM/DL (ref 2.4–3.5)
GLUCOSE SERPL-MCNC: 102 MG/DL (ref 82–115)
HCT VFR BLD AUTO: 40.1 % (ref 37–47)
HDLC SERPL-MCNC: 60 MG/DL (ref 35–60)
HGB BLD-MCNC: 12.5 GM/DL (ref 12–16)
IMM GRANULOCYTES # BLD AUTO: 0.02 X10(3)/MCL (ref 0–0.04)
IMM GRANULOCYTES NFR BLD AUTO: 0.4 %
LDLC SERPL CALC-MCNC: 85 MG/DL (ref 50–140)
LYMPHOCYTES # BLD AUTO: 1.78 X10(3)/MCL (ref 0.6–4.6)
LYMPHOCYTES NFR BLD AUTO: 37.7 %
MCH RBC QN AUTO: 27.2 PG (ref 27–31)
MCHC RBC AUTO-ENTMCNC: 31.2 MG/DL (ref 33–36)
MCV RBC AUTO: 87.4 FL (ref 80–94)
MONOCYTES # BLD AUTO: 0.44 X10(3)/MCL (ref 0.1–1.3)
MONOCYTES NFR BLD AUTO: 9.3 %
NEUTROPHILS # BLD AUTO: 2.3 X10(3)/MCL (ref 2.1–9.2)
NEUTROPHILS NFR BLD AUTO: 48.5 %
NRBC BLD AUTO-RTO: 0 %
PLATELET # BLD AUTO: 195 X10(3)/MCL (ref 130–400)
PMV BLD AUTO: 9.6 FL (ref 7.4–10.4)
POTASSIUM SERPL-SCNC: 4.6 MMOL/L (ref 3.5–5.1)
PROT SERPL-MCNC: 6.8 GM/DL (ref 5.8–7.6)
RBC # BLD AUTO: 4.59 X10(6)/MCL (ref 4.2–5.4)
SODIUM SERPL-SCNC: 143 MMOL/L (ref 136–145)
TRIGL SERPL-MCNC: 88 MG/DL (ref 37–140)
VLDLC SERPL CALC-MCNC: 18 MG/DL
WBC # SPEC AUTO: 4.7 X10(3)/MCL (ref 4.5–11.5)

## 2022-07-29 PROCEDURE — 85025 COMPLETE CBC W/AUTO DIFF WBC: CPT

## 2022-07-29 PROCEDURE — 36415 COLL VENOUS BLD VENIPUNCTURE: CPT

## 2022-07-29 PROCEDURE — 80053 COMPREHEN METABOLIC PANEL: CPT

## 2022-07-29 PROCEDURE — 80061 LIPID PANEL: CPT

## 2022-08-04 ENCOUNTER — OFFICE VISIT (OUTPATIENT)
Dept: INTERNAL MEDICINE | Facility: CLINIC | Age: 72
End: 2022-08-04
Payer: MEDICARE

## 2022-08-04 VITALS
DIASTOLIC BLOOD PRESSURE: 82 MMHG | BODY MASS INDEX: 38.28 KG/M2 | HEIGHT: 60 IN | RESPIRATION RATE: 18 BRPM | OXYGEN SATURATION: 97 % | SYSTOLIC BLOOD PRESSURE: 140 MMHG | WEIGHT: 195 LBS | HEART RATE: 72 BPM

## 2022-08-04 DIAGNOSIS — E78.5 HYPERLIPIDEMIA, UNSPECIFIED HYPERLIPIDEMIA TYPE: ICD-10-CM

## 2022-08-04 DIAGNOSIS — Z11.59 NEED FOR HEPATITIS C SCREENING TEST: ICD-10-CM

## 2022-08-04 DIAGNOSIS — Z00.00 ENCOUNTER FOR MEDICARE ANNUAL WELLNESS EXAM: ICD-10-CM

## 2022-08-04 DIAGNOSIS — G25.81 RESTLESS LEGS SYNDROME: ICD-10-CM

## 2022-08-04 DIAGNOSIS — N32.81 OVERACTIVE BLADDER: ICD-10-CM

## 2022-08-04 DIAGNOSIS — R73.9 HYPERGLYCEMIA: Primary | ICD-10-CM

## 2022-08-04 PROBLEM — M17.12 OSTEOARTHRITIS OF LEFT KNEE: Status: ACTIVE | Noted: 2022-08-04

## 2022-08-04 PROBLEM — E66.9 OBESITY: Status: ACTIVE | Noted: 2022-08-04

## 2022-08-04 PROBLEM — M85.80 OSTEOPENIA: Status: ACTIVE | Noted: 2022-08-04

## 2022-08-04 PROBLEM — Z85.3 HISTORY OF BREAST CANCER: Status: ACTIVE | Noted: 2022-08-04

## 2022-08-04 PROBLEM — F41.1 GENERALIZED ANXIETY DISORDER: Status: ACTIVE | Noted: 2022-08-04

## 2022-08-04 PROCEDURE — G0439 PR MEDICARE ANNUAL WELLNESS SUBSEQUENT VISIT: ICD-10-PCS | Mod: ,,, | Performed by: INTERNAL MEDICINE

## 2022-08-04 PROCEDURE — G0439 PPPS, SUBSEQ VISIT: HCPCS | Mod: ,,, | Performed by: INTERNAL MEDICINE

## 2022-08-04 RX ORDER — BETAMETHASONE VALERATE 1.2 MG/G
CREAM TOPICAL 3 TIMES DAILY PRN
COMMUNITY
Start: 2022-04-11

## 2022-08-04 RX ORDER — ATORVASTATIN CALCIUM 20 MG/1
TABLET, FILM COATED ORAL
COMMUNITY
Start: 2021-10-22 | End: 2023-04-27

## 2022-08-04 RX ORDER — ALPRAZOLAM 0.5 MG/1
0.5 TABLET ORAL 3 TIMES DAILY PRN
Qty: 30 TABLET | Refills: 2 | Status: SHIPPED | OUTPATIENT
Start: 2022-08-04 | End: 2022-12-02

## 2022-08-04 NOTE — PATIENT INSTRUCTIONS
Ceferino Smith,     If you are due for any health screening(s) below please notify me so we can arrange them to be ordered and scheduled to maintain your health. Most healthy patients complete it. Don't lose out on improving your health.     Tests to Keep You Healthy    Colon Cancer Screening: Met

## 2022-08-04 NOTE — PROGRESS NOTES
Subjective:      Chief Complaint: Annual Exam (Discuss labs 7/29/Wants shingrix shot )      HPI:  She is here for a medicare wellness.  No complaints.  She has intermittent RLS sx.  She quit taking the gabapentin and requip.  Problem Noted   Generalized Anxiety Disorder 8/4/2022   History of Breast Cancer 8/4/2022    followed by Dr. Graham     Hyperlipidemia 8/4/2022   Obesity 8/4/2022   Osteoarthritis of Left Knee 8/4/2022   Osteopenia 8/4/2022   Restless Legs Syndrome 8/4/2022   Encounter for Medicare Annual Wellness Exam 8/4/2022   Overactive Bladder 8/4/2022        The patient's Health Maintenance was reviewed and the following appears to be due:   Health Maintenance Due   Topic Date Due    Hepatitis C Screening  Never done    Shingles Vaccine (1 of 2) Never done    TETANUS VACCINE  09/01/2015    COVID-19 Vaccine (4 - Booster for Pfizer series) 02/26/2022       Past Medical History:  Past Medical History:   Diagnosis Date    Breast cancer     Depression     H/O female dyspareunia     Restless leg syndrome      Past Surgical History:   Procedure Laterality Date    BREAST LUMPECTOMY      BREAST RECONSTRUCTION      MASTECTOMY      WISDOM TOOTH EXTRACTION       Review of patient's allergies indicates:   Allergen Reactions    Ciprofloxacin     Poison ivy extract      Current Outpatient Medications on File Prior to Visit   Medication Sig Dispense Refill    atorvastatin (LIPITOR) 20 MG tablet   See Instructions, TAKE 1 TABLET BY MOUTH AT BEDTIME, # 90 tab(s), 3 Refill(s), Pharmacy: [id number], 155, cm, Height/Length Dosing, 08/18/21 15:01:00 CDT, 87.6, kg, Weight Dosing, 08/18/21 15:01:00 CDT      betamethasone valerate 0.1% (VALISONE) 0.1 % Crea Apply topically 3 (three) times daily.      [DISCONTINUED] ALPRAZolam (XANAX) 0.5 MG tablet TAKE 1 TABLET BY MOUTH THREE TIMES DAILY AS NEEDED FOR ANXIETY 30 tablet 0     No current facility-administered medications on file prior to visit.  "    Social History     Socioeconomic History    Marital status:    Tobacco Use    Smoking status: Never Smoker    Smokeless tobacco: Never Used     Family History   Problem Relation Age of Onset    Alzheimer's disease Mother     Hypertension Father     Lymphoma Father     Cancer Sister     Breast cancer Maternal Aunt        Review of Systems    Objective:   BP (!) 140/82 (BP Location: Left arm, Patient Position: Sitting, BP Method: Large (Manual))   Pulse 72   Resp 18   Ht 5' 0.24" (1.53 m)   Wt 88.5 kg (195 lb)   SpO2 97%   BMI 37.79 kg/m²     Physical Exam  Constitutional:       General: She is not in acute distress.     Appearance: Normal appearance.   HENT:      Head: Normocephalic and atraumatic.   Eyes:      General: No scleral icterus.     Conjunctiva/sclera: Conjunctivae normal.   Neck:      Vascular: No carotid bruit.   Cardiovascular:      Rate and Rhythm: Normal rate and regular rhythm.      Pulses: Normal pulses.      Heart sounds: Normal heart sounds. No murmur heard.    No friction rub. No gallop.   Pulmonary:      Effort: Pulmonary effort is normal.      Breath sounds: Normal breath sounds.   Abdominal:      General: Bowel sounds are normal.      Palpations: Abdomen is soft. There is no mass.      Tenderness: There is no abdominal tenderness. There is no guarding or rebound.   Musculoskeletal:         General: No deformity.      Cervical back: No rigidity or tenderness.      Right lower leg: No edema.      Left lower leg: No edema.   Lymphadenopathy:      Cervical: No cervical adenopathy.   Skin:     Coloration: Skin is not jaundiced or pale.      Findings: No erythema.   Neurological:      General: No focal deficit present.      Mental Status: She is alert and oriented to person, place, and time.      Gait: Gait normal.   Psychiatric:         Mood and Affect: Mood normal.         Behavior: Behavior normal.         Thought Content: Thought content normal.         Judgment: " Judgment normal.       Assessment and Plan:     Encounter for Medicare annual wellness exam  Health Maintenance Due   Topic Date Due    Hepatitis C Screening  Never done    TETANUS VACCINE  Never done    Shingles Vaccine (1 of 2) Never done    COVID-19 Vaccine (4 - Booster for Pfizer series) 02/26/2022     I have reviewed the nurse notes and flow sheets to review the HRA, ADL, TUG, Whisper test, fall screening and mini-cog assessment.  A personalized 5-10 year written screening schedule and personal prevention plan has been developed using the USPSTF age appropriate recommendations and this was provided to the patient at the end of today's visit.  Please see the AVS for those details.              Restless legs syndrome  She is considering treatment and will let me know when she is ready.    Hyperlipidemia  Controlled, cont med.       Overactive bladder  Rec minimize bladder irritants, kegel exercises, handout on mind over bladder given to patient.     Follow up in about 1 year (around 8/4/2023).    Medications Ordered This Encounter   Medications    ALPRAZolam (XANAX) 0.5 MG tablet     Sig: Take 1 tablet (0.5 mg total) by mouth 3 (three) times daily as needed.     Dispense:  30 tablet     Refill:  2     [unfilled]  Orders Placed This Encounter   Procedures    Lipid Panel     Standing Status:   Future     Standing Expiration Date:   10/3/2023    Comprehensive Metabolic Panel     Standing Status:   Future     Standing Expiration Date:   10/3/2023    CBC Auto Differential     Standing Status:   Future     Standing Expiration Date:   10/3/2023    Hepatitis C Antibody     Standing Status:   Future     Standing Expiration Date:   10/3/2023     Order Specific Question:   Release to patient     Answer:   Immediate    Hemoglobin A1C     Standing Status:   Future     Standing Expiration Date:   10/3/2023

## 2022-08-04 NOTE — ASSESSMENT & PLAN NOTE
Health Maintenance Due   Topic Date Due    Hepatitis C Screening  Never done    TETANUS VACCINE  Never done    Shingles Vaccine (1 of 2) Never done    COVID-19 Vaccine (4 - Booster for Pfizer series) 02/26/2022     I have reviewed the nurse notes and flow sheets to review the HRA, ADL, TUG, Whisper test, fall screening and mini-cog assessment.  A personalized 5-10 year written screening schedule and personal prevention plan has been developed using the USPSTF age appropriate recommendations and this was provided to the patient at the end of today's visit.  Please see the AVS for those details.

## 2022-08-11 DIAGNOSIS — C50.912 BILATERAL MALIGNANT NEOPLASM OF BREAST IN FEMALE, UNSPECIFIED ESTROGEN RECEPTOR STATUS, UNSPECIFIED SITE OF BREAST: ICD-10-CM

## 2022-08-11 DIAGNOSIS — Z85.3 HISTORY OF BREAST CANCER: Primary | ICD-10-CM

## 2022-08-11 DIAGNOSIS — C50.911 BILATERAL MALIGNANT NEOPLASM OF BREAST IN FEMALE, UNSPECIFIED ESTROGEN RECEPTOR STATUS, UNSPECIFIED SITE OF BREAST: ICD-10-CM

## 2022-08-18 ENCOUNTER — HOSPITAL ENCOUNTER (OUTPATIENT)
Dept: RADIOLOGY | Facility: HOSPITAL | Age: 72
Discharge: HOME | End: 2022-08-18
Attending: INTERNAL MEDICINE
Payer: MEDICARE

## 2022-08-18 DIAGNOSIS — Z12.31 SCREENING MAMMOGRAM FOR HIGH-RISK PATIENT: ICD-10-CM

## 2022-08-18 DIAGNOSIS — Z85.3 HISTORY OF BREAST CANCER: ICD-10-CM

## 2022-08-18 PROCEDURE — 77067 MAMMO DIGITAL SCREENING BILAT WITH TOMO: ICD-10-PCS | Mod: 26,,, | Performed by: RADIOLOGY

## 2022-08-18 PROCEDURE — 77063 BREAST TOMOSYNTHESIS BI: CPT | Mod: 26,,, | Performed by: RADIOLOGY

## 2022-08-18 PROCEDURE — 77067 SCR MAMMO BI INCL CAD: CPT | Mod: TC

## 2022-08-18 PROCEDURE — 77063 MAMMO DIGITAL SCREENING BILAT WITH TOMO: ICD-10-PCS | Mod: 26,,, | Performed by: RADIOLOGY

## 2022-08-18 PROCEDURE — 77067 SCR MAMMO BI INCL CAD: CPT | Mod: 26,,, | Performed by: RADIOLOGY

## 2022-09-12 ENCOUNTER — OFFICE VISIT (OUTPATIENT)
Dept: HEMATOLOGY/ONCOLOGY | Facility: CLINIC | Age: 72
End: 2022-09-12
Payer: MEDICARE

## 2022-09-12 VITALS
SYSTOLIC BLOOD PRESSURE: 111 MMHG | HEIGHT: 62 IN | OXYGEN SATURATION: 96 % | TEMPERATURE: 98 F | DIASTOLIC BLOOD PRESSURE: 78 MMHG | BODY MASS INDEX: 35.51 KG/M2 | HEART RATE: 68 BPM | WEIGHT: 193 LBS

## 2022-09-12 DIAGNOSIS — Z85.3 HISTORY OF BREAST CANCER: ICD-10-CM

## 2022-09-12 DIAGNOSIS — Z12.31 SCREENING MAMMOGRAM FOR HIGH-RISK PATIENT: Primary | ICD-10-CM

## 2022-09-12 PROCEDURE — 99999 PR PBB SHADOW E&M-EST. PATIENT-LVL IV: CPT | Mod: PBBFAC,,, | Performed by: INTERNAL MEDICINE

## 2022-09-12 PROCEDURE — 99214 OFFICE O/P EST MOD 30 MIN: CPT | Mod: S$PBB,,, | Performed by: INTERNAL MEDICINE

## 2022-09-12 PROCEDURE — 99214 PR OFFICE/OUTPT VISIT, EST, LEVL IV, 30-39 MIN: ICD-10-PCS | Mod: S$PBB,,, | Performed by: INTERNAL MEDICINE

## 2022-09-12 PROCEDURE — 99214 OFFICE O/P EST MOD 30 MIN: CPT | Mod: PBBFAC | Performed by: INTERNAL MEDICINE

## 2022-09-12 PROCEDURE — 99999 PR PBB SHADOW E&M-EST. PATIENT-LVL IV: ICD-10-PCS | Mod: PBBFAC,,, | Performed by: INTERNAL MEDICINE

## 2022-09-12 NOTE — PROGRESS NOTES
HEMATOLOGY/ONCOLOGY OFFICE CLINIC VISIT    Visit Information:    Initial Evaluation: 11/8/2005  Referring Provider:   Other providers:  Code status: Not addressed    Diagnosis:   1) Stage I left breast cancer diagnosed in 2004   - Left: lumpectomy 4/20/04 followed by XRT and Arimidex 7967-3129--> Evista x 3 yrs  2) Stage II right breast cancer diagnosed in 9/1989   - Right: Mastectomy followed by CAF x 6 in 1989    Present Treatment:  Surveillance    Imaging:  Mammogram 8/16/21: IMPRESSION: BENIGN There is no mammographic evidence of malignancy.  RECOMMENDATIONS:  A routine screening mammogram in one year in the absence of significant clinical findings in the interval is recommended (August 2022).    Mammogram 8/18/2022: IMPRESSION: BENIGN There is no mammographic evidence of malignancy. RECOMMENDATIONS: A routine screening mammogram in one year in the absence of significant clinical findings in the interval is recommended (August 2023).       Pathology:    CLINICAL HISTORY:       Patient: Ms Clarke was diagnosed with right breast cancer in September of 1989 for which she underwent right mastectomy in 9/1989 for at least stage II disease and took chemotherapy with CAF x 6 in [place], [place]. She developed Stage I left breast cancer in 2004 for which she had a lumpectomy 4/20/04 and radiation followed by Arimidex x 5 years and completed it 2009 and then she was placed on Evista. I saw her for the first time on 11/8/2005 after moving from [place] following [place]. Medical records not accessible.     She has a colonoscopy back in 3/12/13 she is s/p polypectomy and pathology report revealed CECAL POLYP, BIOPSY: TUBULAR ADENOMA, SMALL FRAGMENT;  COLON;  NO EVIDENCE OF HIGH GRADE DYSPLASIA.  It will repeat in 3 year     During previous visits: She stated that she has been having pain in her legs at night time and she associated it with Requip that she just started for her restless leg syndrome. It also started when  "she increased the medication. She stopped the Requip already. She denies any swelling of her legs or any pain during the day time.   She lost weight intentionally with weight watchers, but has gain 20 lbs back. She told me that she was anxious and on 11/20/13 she [facility] As per Dr Gomez note: The patient states that she has been seeing advertisements for various cancer centers that suggest a "team approach."  She states that she has never had "a team."  She reported to Dr. Estes that she has been having some difficulty with "hoarding" and it was suggested that the patient talk with Dr Gomez. She engages in hoarding behavior that is interfering with her relationship with her . Medical psychology has been consulted. She stated that she is doing better now. She has not been seen by Dr Gomez anymore and she is declining further visits with him at this time.    She is struggling with her weight and has been on different medications to see if this helps her. No weight loss with the Contrave.  She looked into lap band, but doesn't qualify.  She quit taking the Contrave. She then tried another prescription medication but too expensive therefore she did not take,Qsymia.       Chief Complaint: No Concerns today    Interval History:  Patient presents today for annual breast exam and follow up to discuss mammogram results, she is accompanied by her significant other. She has not had Ob/Gyn exam in more than 2 years. She is doing well. Denies fever, chills or sweats. No chest pain or shortness of breath. No bleeding.  Review mammogram with the patient and recommendations for supplemental MRI.    ROS:  All 14 points ROS taken and positive as per Interval History, all other negative.    Histories:  PMH/PSH/FH/SOCIAL/ALLERGIES AND MEDS REVIEWED AND UPDATED AS APPROPRIATE       Vitals:    09/12/22 1454   BP: 111/78   BP Location: Left arm   Patient Position: Sitting   Pulse: 68   Temp: 98.3 °F " "(36.8 °C)   TempSrc: Oral   SpO2: 96%   Weight: 87.5 kg (193 lb)   Height: 5' 2" (1.575 m)      Physical Exam  Vitals and nursing note reviewed.   Constitutional:       General: She is not in acute distress.     Appearance: Normal appearance. She is well-developed.   HENT:      Head: Normocephalic and atraumatic.      Mouth/Throat:      Mouth: Mucous membranes are moist.   Eyes:      General: No scleral icterus.     Extraocular Movements: Extraocular movements intact.      Conjunctiva/sclera: Conjunctivae normal.      Pupils: Pupils are equal, round, and reactive to light.   Neck:      Vascular: No JVD.   Cardiovascular:      Rate and Rhythm: Normal rate and regular rhythm.      Heart sounds: No murmur heard.  Pulmonary:      Effort: Pulmonary effort is normal.      Breath sounds: Normal breath sounds. No wheezing or rhonchi.   Chest:      Chest wall: No deformity or tenderness.   Breasts:     Right: No swelling, mass, skin change or tenderness.      Left: No swelling, mass, nipple discharge, skin change or tenderness.      Comments: Right reconstructed breast  Abdominal:      General: Bowel sounds are normal. There is no distension.      Palpations: Abdomen is soft. There is no mass.      Tenderness: There is no abdominal tenderness.   Musculoskeletal:         General: No swelling or deformity.      Cervical back: Neck supple.   Lymphadenopathy:      Cervical: No cervical adenopathy.      Upper Body:      Right upper body: No supraclavicular or axillary adenopathy.      Left upper body: No supraclavicular or axillary adenopathy.      Lower Body: No right inguinal adenopathy. No left inguinal adenopathy.   Skin:     General: Skin is warm.      Coloration: Skin is not jaundiced.      Findings: No lesion or rash.      Nails: There is no clubbing.   Neurological:      General: No focal deficit present.      Mental Status: She is alert and oriented to person, place, and time.      Sensory: Sensation is intact.      " Motor: Motor function is intact.      Gait: Gait is intact.   Psychiatric:         Attention and Perception: Attention normal.         Mood and Affect: Mood and affect normal.         Speech: Speech normal.         Behavior: Behavior is cooperative.         Thought Content: Thought content normal.         Cognition and Memory: Cognition normal.         Judgment: Judgment normal.   ECOG SCORE    0 - Fully active-able to carry on all pre-disease performance without restriction         Laboratory:  CBC with Differential:  Lab Results   Component Value Date    WBC 5.7 09/12/2022    RBC 4.57 09/12/2022    HGB 12.4 09/12/2022    HCT 39.3 09/12/2022    MCV 86.0 09/12/2022    MCH 27.1 09/12/2022    MCHC 31.6 (L) 09/12/2022    RDW 13.1 09/12/2022     09/12/2022    MPV 9.0 09/12/2022        CMP:  Sodium Level   Date Value Ref Range Status   07/29/2022 143 136 - 145 mmol/L Final     Potassium Level   Date Value Ref Range Status   07/29/2022 4.6 3.5 - 5.1 mmol/L Final     Carbon Dioxide   Date Value Ref Range Status   07/29/2022 27 23 - 31 mmol/L Final     Blood Urea Nitrogen   Date Value Ref Range Status   07/29/2022 12.3 9.8 - 20.1 mg/dL Final     Creatinine   Date Value Ref Range Status   07/29/2022 0.72 0.55 - 1.02 mg/dL Final     Calcium Level Total   Date Value Ref Range Status   07/29/2022 9.5 8.4 - 10.2 mg/dL Final     Albumin Level   Date Value Ref Range Status   07/29/2022 3.9 3.4 - 4.8 gm/dL Final     Bilirubin Total   Date Value Ref Range Status   07/29/2022 1.2 <=1.5 mg/dL Final     Alkaline Phosphatase   Date Value Ref Range Status   07/29/2022 101 40 - 150 unit/L Final     Aspartate Aminotransferase   Date Value Ref Range Status   07/29/2022 20 5 - 34 unit/L Final     Alanine Aminotransferase   Date Value Ref Range Status   07/29/2022 19 0 - 55 unit/L Final     Estimated GFR-Non    Date Value Ref Range Status   07/29/2022 >60 mls/min/1.73/m2 Final             Assessment:       1. Screening  mammogram for high-risk patient    2. History of breast cancer      Bilateral breast cancer:  1) Stage I left breast cancer diagnosed in 2004   - Left: lumpectomy 4/20/04 followed by XRT and Arimidex [id number]--> Evista x 3 yrs  2) Stage II right breast cancer diagnosed in 9/1989   - Right: Mastectomy followed by CAF x 6 in 1989        Plan:       No clinical evidence of recurrence.  Recommend follow up with Ob/Gyn--Dr. Arce  Vahid screening mammogram due in 8/2023--ordered  MRI left breast in 6 months as recommended by breast radiologist--ordered  RTC 6 months or earlier if needed with labs.   Labs: CBC, CMP    Instructed to call for any questions or problems  The patient was given ample opportunity to ask questions and they were all answered to satisfaction; patient demonstrated understanding of what we discussed and is agreeable to the plan.    ENA MLILS MD      Professional Services   I, Herminia Betts LPN, acted solely as a scribe for and in the presence of Dr. Ena Mills, who performed these services.

## 2022-09-23 ENCOUNTER — DOCUMENTATION ONLY (OUTPATIENT)
Dept: ADMINISTRATIVE | Facility: HOSPITAL | Age: 72
End: 2022-09-23
Payer: MEDICARE

## 2022-11-07 PROBLEM — Z00.00 ENCOUNTER FOR MEDICARE ANNUAL WELLNESS EXAM: Status: RESOLVED | Noted: 2022-08-04 | Resolved: 2022-11-07

## 2023-03-02 ENCOUNTER — TELEPHONE (OUTPATIENT)
Dept: HEMATOLOGY/ONCOLOGY | Facility: CLINIC | Age: 73
End: 2023-03-02
Payer: MEDICARE

## 2023-03-02 NOTE — TELEPHONE ENCOUNTER
Patient needs to have MRI breast scheduled this month. Mammogram not due until August. She states that a  from hospital called her about scheduling both at same time. I explained that she will alternate every 6 months.

## 2023-03-28 ENCOUNTER — TELEPHONE (OUTPATIENT)
Dept: INTERNAL MEDICINE | Facility: CLINIC | Age: 73
End: 2023-03-28
Payer: MEDICARE

## 2023-03-28 NOTE — TELEPHONE ENCOUNTER
----- Message from Skip Wolf sent at 3/28/2023  1:28 PM CDT -----  .Type:  Needs Medical Advice    Who Called: Sarah  Symptoms (please be specific):    How long has patient had these symptoms:    Pharmacy name and phone #:  [facility] in [place]   Would the patient rather a call back or a response via [facility]?   Best Call Back Number: [phone]  Additional Information: She requested a call back from the nurse.  Re: medication refill questions.  ALPRAZolam (XANAX) 0.5 MG tablet

## 2023-03-28 NOTE — TELEPHONE ENCOUNTER
I spoke with pt. She said she takes it about 6 times a week. She usually can just handle taking a half of a tablet once a day.

## 2023-03-29 ENCOUNTER — OFFICE VISIT (OUTPATIENT)
Dept: URGENT CARE | Facility: CLINIC | Age: 73
End: 2023-03-29
Payer: MEDICARE

## 2023-03-29 VITALS
BODY MASS INDEX: 35.51 KG/M2 | HEART RATE: 81 BPM | RESPIRATION RATE: 18 BRPM | WEIGHT: 193 LBS | TEMPERATURE: 98 F | SYSTOLIC BLOOD PRESSURE: 113 MMHG | DIASTOLIC BLOOD PRESSURE: 77 MMHG | HEIGHT: 62 IN | OXYGEN SATURATION: 96 %

## 2023-03-29 DIAGNOSIS — L03.115 CELLULITIS OF RIGHT LOWER EXTREMITY: Primary | ICD-10-CM

## 2023-03-29 PROCEDURE — 99213 OFFICE O/P EST LOW 20 MIN: CPT | Mod: S$PBB,,, | Performed by: FAMILY MEDICINE

## 2023-03-29 PROCEDURE — 99213 PR OFFICE/OUTPT VISIT, EST, LEVL III, 20-29 MIN: ICD-10-PCS | Mod: S$PBB,,, | Performed by: FAMILY MEDICINE

## 2023-03-29 RX ORDER — HYDROXYZINE PAMOATE 25 MG/1
25 CAPSULE ORAL NIGHTLY
Qty: 7 CAPSULE | Refills: 0 | Status: SHIPPED | OUTPATIENT
Start: 2023-03-29 | End: 2023-04-05

## 2023-03-29 RX ORDER — SULFAMETHOXAZOLE AND TRIMETHOPRIM 800; 160 MG/1; MG/1
1 TABLET ORAL 2 TIMES DAILY
Qty: 14 TABLET | Refills: 0 | Status: SHIPPED | OUTPATIENT
Start: 2023-03-29 | End: 2023-04-05

## 2023-03-29 RX ORDER — ALPRAZOLAM 0.5 MG/1
0.5 TABLET ORAL
Qty: 30 TABLET | Refills: 0 | Status: CANCELLED | OUTPATIENT
Start: 2023-03-29

## 2023-03-29 NOTE — TELEPHONE ENCOUNTER
I spoke with pt. I told her what you advised. She still is wanting a refill for the xanax. She said she will try without as much as she can but she will feel better if she has a refill on hand just incase she needs it. She said she went over 2 months with the same prescription so that should tell us that she is not addicted to the medication.

## 2023-03-29 NOTE — PROGRESS NOTES
"Subjective:      Patient ID: Sarah Clarke is a 73 y.o. female.    Vitals:  height is 5' 2" (1.575 m) and weight is 87.5 kg (193 lb). Her temperature is 98.4 °F (36.9 °C). Her blood pressure is 113/77 and her pulse is 81. Her respiration is 18 and oxygen saturation is 96%.     Chief Complaint: Insect Bite (Bug bite on right leg calf. Got bit on Monday. Hot to the touch, itches, red, slightly swollen.)    2 of lesion that has become more erythematous and edematous to the LE. No fever.       Constitution: Negative for chills, sweating and fever.   HENT:  Negative for sinus pressure.    Respiratory:  Negative for cough.    Genitourinary:  Negative for frequency, urgency, flank pain, vaginal discharge and pelvic pain.   Skin:  Positive for lesion and erythema. Negative for rash.   Neurological:  Negative for loss of consciousness.    Objective:     Physical Exam   Constitutional: She is oriented to person, place, and time. She appears well-developed.   HENT:   Head: Normocephalic and atraumatic. Head is without abrasion, without contusion and without laceration.   Ears:   Right Ear: External ear normal.   Left Ear: External ear normal.   Nose: Nose normal.   Mouth/Throat: Oropharynx is clear and moist and mucous membranes are normal.   Eyes: Conjunctivae, EOM and lids are normal. Pupils are equal, round, and reactive to light.   Neck: Trachea normal and phonation normal. Neck supple.   Cardiovascular: Normal rate, regular rhythm and normal heart sounds.   Pulmonary/Chest: Effort normal and breath sounds normal. No stridor. No respiratory distress.   Musculoskeletal: Normal range of motion.         General: Normal range of motion.   Neurological: She is alert and oriented to person, place, and time.   Skin: Skin is warm, dry and intact. Capillary refill takes less than 2 seconds. erythema No abrasion, No burn, No bruising and No ecchymosis         Comments: 5x5cm thickened erythematous skin to the RLE medially, " below the knee joint.   Psychiatric: Her speech is normal and behavior is normal. Judgment and thought content normal.   Nursing note and vitals reviewed.    Assessment:     1. Cellulitis of right lower extremity        Plan:       Cellulitis of right lower extremity  -     sulfamethoxazole-trimethoprim 800-160mg (BACTRIM DS) 800-160 mg Tab; Take 1 tablet by mouth 2 (two) times daily. for 7 days  Dispense: 14 tablet; Refill: 0

## 2023-03-29 NOTE — PATIENT INSTRUCTIONS
Bactrim with food twice a day, cool compress.  Should improve by the third day.  Recheck as needed.

## 2023-03-30 RX ORDER — ALPRAZOLAM 0.5 MG/1
0.5 TABLET ORAL 2 TIMES DAILY PRN
Qty: 30 TABLET | Refills: 0 | Status: SHIPPED | OUTPATIENT
Start: 2023-03-30 | End: 2023-05-01

## 2023-04-27 DIAGNOSIS — E78.5 HYPERLIPIDEMIA, UNSPECIFIED HYPERLIPIDEMIA TYPE: Primary | ICD-10-CM

## 2023-04-27 RX ORDER — ATORVASTATIN CALCIUM 20 MG/1
TABLET, FILM COATED ORAL
Qty: 90 TABLET | Refills: 0 | Status: SHIPPED | OUTPATIENT
Start: 2023-04-27 | End: 2023-09-07 | Stop reason: SDUPTHER

## 2023-05-01 RX ORDER — ALPRAZOLAM 0.5 MG/1
TABLET ORAL
Qty: 30 TABLET | Refills: 0 | Status: SHIPPED | OUTPATIENT
Start: 2023-05-01 | End: 2023-06-08

## 2023-05-02 ENCOUNTER — TELEPHONE (OUTPATIENT)
Dept: HEMATOLOGY/ONCOLOGY | Facility: CLINIC | Age: 73
End: 2023-05-02
Payer: MEDICARE

## 2023-05-09 ENCOUNTER — OFFICE VISIT (OUTPATIENT)
Dept: URGENT CARE | Facility: CLINIC | Age: 73
End: 2023-05-09
Payer: MEDICARE

## 2023-05-09 VITALS
OXYGEN SATURATION: 95 % | BODY MASS INDEX: 34.96 KG/M2 | DIASTOLIC BLOOD PRESSURE: 76 MMHG | RESPIRATION RATE: 20 BRPM | TEMPERATURE: 99 F | SYSTOLIC BLOOD PRESSURE: 133 MMHG | HEIGHT: 62 IN | HEART RATE: 81 BPM | WEIGHT: 190 LBS

## 2023-05-09 DIAGNOSIS — R09.89 CHEST CONGESTION: ICD-10-CM

## 2023-05-09 DIAGNOSIS — U07.1 COVID-19 VIRUS DETECTED: ICD-10-CM

## 2023-05-09 DIAGNOSIS — R05.9 COUGH, UNSPECIFIED TYPE: ICD-10-CM

## 2023-05-09 DIAGNOSIS — U07.1 COVID-19: Primary | ICD-10-CM

## 2023-05-09 LAB
CTP QC/QA: YES
SARS-COV-2 RDRP RESP QL NAA+PROBE: POSITIVE

## 2023-05-09 PROCEDURE — 87635 SARS-COV-2 COVID-19 AMP PRB: CPT | Mod: QW,CR,, | Performed by: FAMILY MEDICINE

## 2023-05-09 PROCEDURE — 99213 PR OFFICE/OUTPT VISIT, EST, LEVL III, 20-29 MIN: ICD-10-PCS | Mod: CR,S$PBB,, | Performed by: FAMILY MEDICINE

## 2023-05-09 PROCEDURE — 99213 OFFICE O/P EST LOW 20 MIN: CPT | Mod: CR,S$PBB,, | Performed by: FAMILY MEDICINE

## 2023-05-09 PROCEDURE — 87635: ICD-10-PCS | Mod: QW,CR,, | Performed by: FAMILY MEDICINE

## 2023-05-09 NOTE — PATIENT INSTRUCTIONS
With Concern for COVID-19 infection. Swabs obtained today. COVID-19 Test positive  Adequate hydration and rest. Monitor the symptoms closely  Recommendation is to follow a timeline quarantine measures per CDC and LDH  Tylenol as needed for fever, body aches and headache. Antihistamine of choice for congestion.   Robitussin-DM for cough and cold as needed and as directed.  Trial of vitamin C, zinc 50 mg, vitamin D3 5000 IU   At home quarantine instructions for 5 days since start of symptoms, no fever (100.4 and above) for 24 hours and with improved symptoms can stop home quarantine  Wear a mask, cover your cough and sneeze. Clean any shared surfaces  Call or return to clinic for any questions. ER precautions with any acute change in symptoms.   Late for antiviral medications.  For worsening chest congestion and signs of infection call clinic will consider antibiotics  Follow up with primary MD

## 2023-05-09 NOTE — PROGRESS NOTES
"Subjective:      Patient ID: Sarah Clarke is a 73 y.o. female.    Vitals:  height is 5' 2" (1.575 m) and weight is 86.2 kg (190 lb). Her temperature is 98.5 °F (36.9 °C). Her blood pressure is 133/76 and her pulse is 81. Her respiration is 20 and oxygen saturation is 95%.     Chief Complaint: Sinus Problem (Sinus congestion, had sore throat but better now, cough started last Thursday)    HPI:  73-year-old female with known history of elevated cholesterol currently on medication follows up with primary MD Dr. Tavares.  Present to clinic with concerns of nasal congestion, sinus congestion, sore throat and coughing since 6 days.  No concerns of exposure to infections.  Patient is vaccinated for COVID-19.  No fever in the clinic.  O2 sats 95%.  Denies chest pain shortness a breath or wheezing.    ROS :  Constitutional : _ No fever , Body aches, Chills  HENT_sore throat, postnasal drainage  Respiratory_no wheezing, no shortness of breath  Cardiovascular_no chest pain  Gastrointestinal_ No vomiting, No diarrhea, No abdominal pain  Musculoskeletal_no joint pain, no joint swelling  Integumentary_no skin rash     Objective:     Physical Exam  General : Alert and Oriented, No apparent distress, afebrile, appears comfortable sitting on exam table, clear speech and appropriate communication.  O2 sats 97%.  Neck - supple  HENT : Oropharynx no redness or swelling. .   Respiratory : Bilateral equal breath sounds, nonlabored respirations  Cardiovascular : Rate, rhythm regular, normal volume pulse, no murmur  Integumentary : Warm, Dry and no rash    Assessment:     1. COVID-19    2. Cough, unspecified type    3. Chest congestion      Plan:   With Concern for COVID-19 infection. Swabs obtained today. COVID-19 Test positive  Adequate hydration and rest. Monitor the symptoms closely  Recommendation is to follow a timeline quarantine measures per CDC and LDH  Tylenol as needed for fever, body aches and headache. Antihistamine of " choice for congestion.   Robitussin-DM for cough and cold as needed and as directed.  Trial of vitamin C, zinc 50 mg, vitamin D3 5000 IU   At home quarantine instructions for 5 days since start of symptoms, no fever (100.4 and above) for 24 hours and with improved symptoms can stop home quarantine  Wear a mask, cover your cough and sneeze. Clean any shared surfaces  Call or return to clinic for any questions. ER precautions with any acute change in symptoms.   Late for antiviral medications.  For worsening chest congestion and signs of infection call clinic will consider antibiotics  Follow up with primary MD      COVID-19    Cough, unspecified type  -     POCT COVID-19 Rapid Screening    Chest congestion                     Cardiac catherization with possible intervention

## 2023-06-08 RX ORDER — ALPRAZOLAM 0.5 MG/1
TABLET ORAL
Qty: 30 TABLET | Refills: 0 | Status: SHIPPED | OUTPATIENT
Start: 2023-06-08 | End: 2023-07-18

## 2023-07-18 RX ORDER — ALPRAZOLAM 0.5 MG/1
TABLET ORAL
Qty: 30 TABLET | Refills: 0 | Status: SHIPPED | OUTPATIENT
Start: 2023-07-18 | End: 2023-09-07 | Stop reason: SDUPTHER

## 2023-07-27 ENCOUNTER — LAB VISIT (OUTPATIENT)
Dept: LAB | Facility: HOSPITAL | Age: 73
End: 2023-07-27
Attending: INTERNAL MEDICINE
Payer: MEDICARE

## 2023-07-27 DIAGNOSIS — E78.5 HYPERLIPIDEMIA, UNSPECIFIED HYPERLIPIDEMIA TYPE: ICD-10-CM

## 2023-07-27 DIAGNOSIS — Z11.59 NEED FOR HEPATITIS C SCREENING TEST: ICD-10-CM

## 2023-07-27 DIAGNOSIS — Z00.00 ENCOUNTER FOR MEDICARE ANNUAL WELLNESS EXAM: ICD-10-CM

## 2023-07-27 DIAGNOSIS — G25.81 RESTLESS LEGS SYNDROME: ICD-10-CM

## 2023-07-27 DIAGNOSIS — R73.9 HYPERGLYCEMIA: ICD-10-CM

## 2023-07-27 LAB
ALBUMIN SERPL-MCNC: 4.2 G/DL (ref 3.4–4.8)
ALBUMIN/GLOB SERPL: 1.4 RATIO (ref 1.1–2)
ALP SERPL-CCNC: 76 UNIT/L (ref 40–150)
ALT SERPL-CCNC: 16 UNIT/L (ref 0–55)
AST SERPL-CCNC: 21 UNIT/L (ref 5–34)
BASOPHILS # BLD AUTO: 0.03 X10(3)/MCL
BASOPHILS NFR BLD AUTO: 0.6 %
BILIRUBIN DIRECT+TOT PNL SERPL-MCNC: 2 MG/DL
BUN SERPL-MCNC: 13.2 MG/DL (ref 9.8–20.1)
CALCIUM SERPL-MCNC: 9.8 MG/DL (ref 8.4–10.2)
CHLORIDE SERPL-SCNC: 105 MMOL/L (ref 98–107)
CHOLEST SERPL-MCNC: 150 MG/DL
CHOLEST/HDLC SERPL: 2 {RATIO} (ref 0–5)
CO2 SERPL-SCNC: 29 MMOL/L (ref 23–31)
CREAT SERPL-MCNC: 0.78 MG/DL (ref 0.55–1.02)
EOSINOPHIL # BLD AUTO: 0.07 X10(3)/MCL (ref 0–0.9)
EOSINOPHIL NFR BLD AUTO: 1.5 %
ERYTHROCYTE [DISTWIDTH] IN BLOOD BY AUTOMATED COUNT: 13.4 % (ref 11.5–17)
EST. AVERAGE GLUCOSE BLD GHB EST-MCNC: 116.9 MG/DL
GFR SERPLBLD CREATININE-BSD FMLA CKD-EPI: >60 MLS/MIN/1.73/M2
GLOBULIN SER-MCNC: 2.9 GM/DL (ref 2.4–3.5)
GLUCOSE SERPL-MCNC: 108 MG/DL (ref 82–115)
HBA1C MFR BLD: 5.7 %
HCT VFR BLD AUTO: 40.5 % (ref 37–47)
HCV AB SERPL QL IA: NONREACTIVE
HDLC SERPL-MCNC: 63 MG/DL (ref 35–60)
HGB BLD-MCNC: 13.3 G/DL (ref 12–16)
IMM GRANULOCYTES # BLD AUTO: 0.01 X10(3)/MCL (ref 0–0.04)
IMM GRANULOCYTES NFR BLD AUTO: 0.2 %
LDLC SERPL CALC-MCNC: 68 MG/DL (ref 50–140)
LYMPHOCYTES # BLD AUTO: 1.51 X10(3)/MCL (ref 0.6–4.6)
LYMPHOCYTES NFR BLD AUTO: 31.9 %
MCH RBC QN AUTO: 27.8 PG (ref 27–31)
MCHC RBC AUTO-ENTMCNC: 32.8 G/DL (ref 33–36)
MCV RBC AUTO: 84.6 FL (ref 80–94)
MONOCYTES # BLD AUTO: 0.37 X10(3)/MCL (ref 0.1–1.3)
MONOCYTES NFR BLD AUTO: 7.8 %
NEUTROPHILS # BLD AUTO: 2.74 X10(3)/MCL (ref 2.1–9.2)
NEUTROPHILS NFR BLD AUTO: 58 %
NRBC BLD AUTO-RTO: 0 %
PLATELET # BLD AUTO: 164 X10(3)/MCL (ref 130–400)
PMV BLD AUTO: 8.9 FL (ref 7.4–10.4)
POTASSIUM SERPL-SCNC: 4.4 MMOL/L (ref 3.5–5.1)
PROT SERPL-MCNC: 7.1 GM/DL (ref 5.8–7.6)
RBC # BLD AUTO: 4.79 X10(6)/MCL (ref 4.2–5.4)
SODIUM SERPL-SCNC: 142 MMOL/L (ref 136–145)
TRIGL SERPL-MCNC: 97 MG/DL (ref 37–140)
VLDLC SERPL CALC-MCNC: 19 MG/DL
WBC # SPEC AUTO: 4.73 X10(3)/MCL (ref 4.5–11.5)

## 2023-07-27 PROCEDURE — 80061 LIPID PANEL: CPT

## 2023-07-27 PROCEDURE — 80053 COMPREHEN METABOLIC PANEL: CPT

## 2023-07-27 PROCEDURE — 85025 COMPLETE CBC W/AUTO DIFF WBC: CPT

## 2023-07-27 PROCEDURE — 83036 HEMOGLOBIN GLYCOSYLATED A1C: CPT

## 2023-07-27 PROCEDURE — 36415 COLL VENOUS BLD VENIPUNCTURE: CPT

## 2023-07-27 PROCEDURE — 86803 HEPATITIS C AB TEST: CPT

## 2023-08-03 ENCOUNTER — TELEPHONE (OUTPATIENT)
Dept: INTERNAL MEDICINE | Facility: CLINIC | Age: 73
End: 2023-08-03
Payer: MEDICARE

## 2023-08-03 NOTE — TELEPHONE ENCOUNTER
----- Message from [place] Hand LPN sent at 8/3/2023  8:53 AM CDT -----  Regarding: NELSON Tavares 8/10/23 @1:40p  Are there any outstanding tasks in the chart? no    Is there any documentation of tasks? no    Has patient seen another physician, been to the ER, C, or admitted to hospital since last visit?    Has the patient done blood work or imaging since last visit?

## 2023-08-10 ENCOUNTER — OFFICE VISIT (OUTPATIENT)
Dept: INTERNAL MEDICINE | Facility: CLINIC | Age: 73
End: 2023-08-10
Payer: MEDICARE

## 2023-08-10 VITALS
RESPIRATION RATE: 18 BRPM | HEART RATE: 69 BPM | WEIGHT: 191 LBS | BODY MASS INDEX: 35.15 KG/M2 | OXYGEN SATURATION: 95 % | DIASTOLIC BLOOD PRESSURE: 72 MMHG | SYSTOLIC BLOOD PRESSURE: 110 MMHG | HEIGHT: 62 IN

## 2023-08-10 DIAGNOSIS — F41.1 GENERALIZED ANXIETY DISORDER: ICD-10-CM

## 2023-08-10 DIAGNOSIS — Z00.00 ENCOUNTER FOR MEDICARE ANNUAL WELLNESS EXAM: ICD-10-CM

## 2023-08-10 DIAGNOSIS — E78.5 HYPERLIPIDEMIA, UNSPECIFIED HYPERLIPIDEMIA TYPE: Primary | ICD-10-CM

## 2023-08-10 DIAGNOSIS — Z78.0 MENOPAUSE: ICD-10-CM

## 2023-08-10 DIAGNOSIS — R17 ELEVATED BILIRUBIN: ICD-10-CM

## 2023-08-10 PROCEDURE — G0439 PR MEDICARE ANNUAL WELLNESS SUBSEQUENT VISIT: ICD-10-PCS | Mod: ,,, | Performed by: INTERNAL MEDICINE

## 2023-08-10 PROCEDURE — G0439 PPPS, SUBSEQ VISIT: HCPCS | Mod: ,,, | Performed by: INTERNAL MEDICINE

## 2023-08-10 RX ORDER — BUPROPION HYDROCHLORIDE 150 MG/1
150 TABLET ORAL DAILY
Qty: 30 TABLET | Refills: 3 | Status: SHIPPED | OUTPATIENT
Start: 2023-08-10 | End: 2023-09-20 | Stop reason: SDUPTHER

## 2023-08-10 NOTE — ASSESSMENT & PLAN NOTE
She's used wellbutrin successfully in the past.  Will initiate a low dose of that to see if we can reduce her xanax requirements.  Recheck 6 weeks.

## 2023-08-10 NOTE — ASSESSMENT & PLAN NOTE
Health Maintenance Due   Topic Date Due    TETANUS VACCINE  09/01/2015    DEXA Scan  07/30/2022    COVID-19 Vaccine (4 - Pfizer series) 12/13/2022     Recent labs reviewed and discussed.  Refer for BMD.

## 2023-08-10 NOTE — PROGRESS NOTES
Subjective:        Patient Care Team:  Nilam Tavares MD as PCP - General (Internal Medicine)     Chief Complaint: Medicare AWV (Discuss labs )      HPI: She is here for a medicare wellness.  She is under some stress because her spouse has liver cancer.  She takes xanax to help her leave the house -- sometimes she doesn't need the whole tab.  She will take 1-1.5 per day.  She's taken Wellbutrin successfully in the past.  And she wants to avoid anything that could cause weight gain.    She still has the overactive bladder sx at times, but its manageable.  The RLS hasn't been much of an issue as it was before.    Problem Noted   Encounter for Medicare Annual Wellness Exam 8/4/2022   Elevated Bilirubin 8/10/2023   Generalized Anxiety Disorder 8/4/2022   History of Breast Cancer 8/4/2022    followed by Dr. Graham     Hyperlipidemia 8/4/2022   Obesity 8/4/2022   Osteoarthritis of Left Knee 8/4/2022   Osteopenia 8/4/2022   Restless Legs Syndrome 8/4/2022   Overactive Bladder 8/4/2022        The patient's Health Maintenance was reviewed and the following appears to be due:   Health Maintenance Due   Topic Date Due    TETANUS VACCINE  09/01/2015    DEXA Scan  07/30/2022    COVID-19 Vaccine (4 - Pfizer series) 12/13/2022       Past Medical History:  Past Medical History:   Diagnosis Date    Breast cancer     Depression     H/O female dyspareunia     Personal history of colonic polyps     Restless leg syndrome      Past Surgical History:   Procedure Laterality Date    BREAST LUMPECTOMY      BREAST RECONSTRUCTION      COLONOSCOPY  05/16/2018    MASTECTOMY      WISDOM TOOTH EXTRACTION       Review of patient's allergies indicates:   Allergen Reactions    Ciprofloxacin     Poison ivy extract      Current Outpatient Medications on File Prior to Visit   Medication Sig Dispense Refill    ALPRAZolam (XANAX) 0.5 MG tablet Take 1 tablet by mouth twice daily as needed for anxiety 30 tablet 0    atorvastatin (LIPITOR) 20 MG tablet  TAKE 1 TABLET BY MOUTH AT BEDTIME 90 tablet 0    betamethasone valerate 0.1% (VALISONE) 0.1 % Crea Apply topically 3 (three) times daily.       No current facility-administered medications on file prior to visit.     Social History     Socioeconomic History    Marital status:    Tobacco Use    Smoking status: Never    Smokeless tobacco: Never   Substance and Sexual Activity    Alcohol use: Not Currently    Drug use: Never     Social Determinants of Health     Financial Resource Strain: Low Risk  (8/10/2023)    Overall Financial Resource Strain (CARDIA)     Difficulty of Paying Living Expenses: Not hard at all   Food Insecurity: No Food Insecurity (8/10/2023)    Hunger Vital Sign     Worried About Running Out of Food in the Last Year: Never true     Ran Out of Food in the Last Year: Never true   Transportation Needs: No Transportation Needs (8/10/2023)    PRAPARE - Transportation     Lack of Transportation (Medical): No     Lack of Transportation (Non-Medical): No   Physical Activity: Insufficiently Active (8/10/2023)    Exercise Vital Sign     Days of Exercise per Week: 3 days     Minutes of Exercise per Session: 30 min   Stress: No Stress Concern Present (8/10/2023)    [employer] - Occupational Stress Questionnaire     Feeling of Stress : Not at all   Social Connections: Socially Integrated (8/10/2023)    Social Connection and Isolation Panel [NHANES]     Frequency of Communication with Friends and Family: Twice a week     Frequency of Social Gatherings with Friends and Family: Twice a week     Attends Church Services: 1 to 4 times per year     Active Member of Clubs or Organizations: No     Attends Club or Organization Meetings: 1 to 4 times per year     Marital Status:    Housing Stability: Unknown (8/10/2023)    Housing Stability Vital Sign     Unable to Pay for Housing in the Last Year: No     Unstable Housing in the Last Year: No     Family History   Problem  Relation Age of Onset    Alzheimer's disease Mother     Hypertension Father     Lymphoma Father     Cancer Sister     Breast cancer Maternal Aunt        Review of Systems    Patient Reported Health Risk Assessment  [age] 70-79  Are you male or female?: Female  During the past four weeks, how much have you been bothered by emotional problems such as feeling anxious, depressed, irritable, sad, or downhearted and blue?: Not at all  During the past five weeks, has your physical and/or emotional health limited your social activities with family, friends, neighbors, or groups?: Not at all  During the past four weeks, how much bodily pain have you generally had?: No pain  During the past four weeks, was someone available to help if you needed and wanted help?: Yes, as much as I wanted  During the past four weeks, what was the hardest physical activity you could do for at least two minutes?: Very heavy  Can you get to places out of walking distance without help?  (For example, can you travel alone on buses or taxis, or drive your own car?): Yes  Can you go shopping for groceries or clothes without someone's help?: Yes  Can you prepare your own meals?: Yes  Can you do your own housework without help?: Yes  Because of any health problems, do you need the help of another person with your personal care needs such as eating, bathing, dressing, or getting around the house?: No  Can you handle your own money without help?: Yes  During the past four weeks, how would you rate your health in general?: Excellent  How have things been going for you during the past four weeks?: Very well  Are you having difficulties driving your car?: No  Do you always fasten your seat belt when you are in a car?: Yes, usually  How often in the past four weeks have you been bothered by falling or dizzy when standing up?: Never  How often in the past four weeks have you been bothered by sexual problems?: Never  How often in the past four  "weeks have you been bothered by trouble eating well?: Never  How often in the past four weeks have you been bothered by teeth or denture problems?: Never  How often in the past four weeks have you been bothered with problems using the telephone?: Never  How often in the past four weeks have you been bothered by tiredness or fatigue?: Never  Have you fallen two or more times in the past year?: No  Are you afraid of falling?: No  Are you a smoker?: No  During the past four weeks, how many drinks of wine, beer, or other alcoholic beverages did you have?: No alcohol at all  Do you exercise for about 20 minutes three or more days a week?: Yes, most of the time  Have you been given any information to help you with hazards in your house that might hurt you?: Yes  Have you been given any information to help you with keeping track of your medications?: Yes  How often do you have trouble taking medicines the way you've been told to take them?: I always take them as prescribed  How confident are you that you can control and manage most of your health problems?: Very confident  What is your race? (Check all that apply.):     Opioid Screening: Patient medication list reviewed, patient is not taking prescription opioids. Patient is not using additional opioids than prescribed. Patient is at low risk of substance abuse based on this opioid use history.     Objective:   /72 (BP Location: Left arm, Patient Position: Sitting, BP Method: Small (Manual))   Pulse 69   Resp 18   Ht 5' 2.01" (1.575 m)   Wt 86.6 kg (191 lb)   SpO2 95%   BMI 34.93 kg/m²     Physical Exam  Constitutional:       General: She is not in acute distress.     Appearance: Normal appearance.   HENT:      Head: Normocephalic and atraumatic.   Eyes:      General: No scleral icterus.     Conjunctiva/sclera: Conjunctivae normal.   Neck:      Vascular: No carotid bruit.   Cardiovascular:      Rate and Rhythm: Normal rate and regular rhythm.      " Pulses: Normal pulses.      Heart sounds: Normal heart sounds. No murmur heard.     No friction rub. No gallop.   Pulmonary:      Effort: Pulmonary effort is normal.      Breath sounds: Normal breath sounds.   Abdominal:      General: Bowel sounds are normal.      Palpations: Abdomen is soft. There is no mass.      Tenderness: There is no abdominal tenderness. There is no guarding or rebound.   Musculoskeletal:         General: No deformity.      Cervical back: No rigidity or tenderness.      Right lower leg: No edema.      Left lower leg: No edema.   Lymphadenopathy:      Cervical: No cervical adenopathy.   Skin:     Coloration: Skin is not jaundiced or pale.      Findings: No erythema.   Neurological:      General: No focal deficit present.      Mental Status: She is alert and oriented to person, place, and time.      Gait: Gait normal.   Psychiatric:         Mood and Affect: Mood normal.         Behavior: Behavior normal.         Thought Content: Thought content normal.         Judgment: Judgment normal.           No flowsheet data found.  Fall Risk Assessment - Outpatient 8/10/2023 9/12/2022 8/4/2022   Mobility Status Ambulatory Ambulatory Ambulatory   Number of falls 0 0 0   Identified as fall risk 0 0 0           Depression Screening  Over the past two weeks, has the patient felt down, depressed, or hopeless?: No  Over the past two weeks, has the patient felt little interest or pleasure in doing things?: No  Functional Ability/Safety Screening  Was the patient's timed Up & Go test unsteady or longer than 30 seconds?: No  Does the patient need help with phone, transportation, shopping, preparing meals, housework, laundry, meds, or managing money?: No  Does the patient's home have rugs in the hallway, lack grab bars in the bathroom, lack handrails on the stairs or have poor lighting?: No  Have you noticed any hearing difficulties?: No  Cognitive Function (Assessed through direct observation with due  consideration of information obtained by way of patient reports and/or concerns raised by family, friends, caretakers, or others)    Does the patient repeat questions/statements in the same day?: No  Does the patient have trouble remembering the date, year, and time?: No  Does the patient have difficulty managing finances?: No  Does the patient have a decreased sense of direction?: No    Assessment and Plan:     Encounter for Medicare annual wellness exam  Health Maintenance Due   Topic Date Due    TETANUS VACCINE  09/01/2015    DEXA Scan  07/30/2022    COVID-19 Vaccine (4 - Pfizer series) 12/13/2022     Recent labs reviewed and discussed.  Refer for BMD.     Hyperlipidemia  Stable, continue lipitor.    Generalized anxiety disorder  She's used wellbutrin successfully in the past.  Will initiate a low dose of that to see if we can reduce her xanax requirements.  Recheck 6 weeks.    Elevated bilirubin  All other liver enzymes are normal.  Will recheck in 6 weeks with direct and indirect bili.   Follow up in about 6 weeks (around 9/21/2023).    Medications Ordered This Encounter   Medications    buPROPion (WELLBUTRIN XL) 150 MG TB24 tablet     Sig: Take 1 tablet (150 mg total) by mouth once daily.     Dispense:  30 tablet     Refill:  3     [unfilled]  Orders Placed This Encounter   Procedures    DXA Bone Density Axial Skeleton 1 or more sites     Standing Status:   Future     Standing Expiration Date:   8/10/2026     Order Specific Question:   May the Radiologist modify the order per protocol to meet the clinical needs of the patient?     Answer:   Yes     Order Specific Question:   Release to patient     Answer:   Immediate    Bilirubin, Total and Direct     Standing Status:   Future     Standing Expiration Date:   10/8/2024         Medicare Annual Wellness and Personalized Prevention Plan:   Fall Risk + Home Safety + Hearing Impairment + Depression Screen + Cognitive Impairment Screen + Health Risk Assessment all  reviewed    Advance Care Planning   I attest to discussing Advance Care Planning with patient and/or family member.  Education was provided including the importance of the Health Care Power of , Advance Directives, and/or [place] documentation.  The patient expressed understanding to the importance of this information and discussion.       Follow up in about 6 weeks (around 9/21/2023). In addition to their scheduled follow up, the patient has also been instructed to follow up on as needed basis.

## 2023-08-21 ENCOUNTER — HOSPITAL ENCOUNTER (OUTPATIENT)
Dept: RADIOLOGY | Facility: HOSPITAL | Age: 73
Discharge: HOME | End: 2023-08-21
Attending: INTERNAL MEDICINE
Payer: MEDICARE

## 2023-08-21 DIAGNOSIS — Z85.3 HISTORY OF BREAST CANCER: ICD-10-CM

## 2023-08-21 DIAGNOSIS — Z12.31 SCREENING MAMMOGRAM FOR HIGH-RISK PATIENT: ICD-10-CM

## 2023-08-21 PROCEDURE — 77067 SCR MAMMO BI INCL CAD: CPT | Mod: TC

## 2023-08-21 PROCEDURE — 77063 BREAST TOMOSYNTHESIS BI: CPT | Mod: 26,,, | Performed by: STUDENT IN AN ORGANIZED HEALTH CARE EDUCATION/TRAINING PROGRAM

## 2023-08-21 PROCEDURE — 77063 MAMMO DIGITAL SCREENING BILAT WITH TOMO: ICD-10-PCS | Mod: 26,,, | Performed by: STUDENT IN AN ORGANIZED HEALTH CARE EDUCATION/TRAINING PROGRAM

## 2023-08-21 PROCEDURE — 77067 MAMMO DIGITAL SCREENING BILAT WITH TOMO: ICD-10-PCS | Mod: 26,,, | Performed by: STUDENT IN AN ORGANIZED HEALTH CARE EDUCATION/TRAINING PROGRAM

## 2023-08-21 PROCEDURE — 77067 SCR MAMMO BI INCL CAD: CPT | Mod: 26,,, | Performed by: STUDENT IN AN ORGANIZED HEALTH CARE EDUCATION/TRAINING PROGRAM

## 2023-09-07 DIAGNOSIS — E78.5 HYPERLIPIDEMIA, UNSPECIFIED HYPERLIPIDEMIA TYPE: ICD-10-CM

## 2023-09-07 RX ORDER — ATORVASTATIN CALCIUM 20 MG/1
20 TABLET, FILM COATED ORAL NIGHTLY
Qty: 90 TABLET | Refills: 0 | Status: SHIPPED | OUTPATIENT
Start: 2023-09-07 | End: 2023-11-28

## 2023-09-07 RX ORDER — ALPRAZOLAM 0.5 MG/1
0.5 TABLET ORAL 2 TIMES DAILY
Qty: 30 TABLET | Refills: 0 | Status: SHIPPED | OUTPATIENT
Start: 2023-09-07 | End: 2023-10-25

## 2023-09-07 NOTE — TELEPHONE ENCOUNTER
----- Message from Jackelin Hermosillo sent at 9/7/2023 12:21 PM CDT -----  Regarding: Labs  .Type:  Patient Returning Call    Who Called:Sarah  Who Left Message for Patient:PT  Does the patient know what this is regarding?:bilirubin   Would the patient rather a call back or a response via [facility]?   Best Call Back Number:[phone]  Additional Information: Patient want to know if this lab bilirubin is fasting. Please call back

## 2023-09-07 NOTE — TELEPHONE ENCOUNTER
Patient advised no fasting necessary.   Physical Therapy Daily Treatment      Visit Count: 6 of 8 (visits) to 4/4/17 (date)  Authorization Status: approved  Next Referring Provider Visit: as needed    Initial Evaluation Date: 2/3/2017  Referred by: Dr. Michelle Borden MD  Medical Diagnosis (from order): Cervical radiculopathy at C6 [M54.12]   Treatment Diagnosis: Neck Symptoms with Pain, Impaired Posture, Impaired Joint Mobility, Impaired Range of Motion, Impaired Motor Function/Muscle Performance, Radiating Pain and Headache  Primary Insurance: [email or web address]  Secondary Insurance: N/A     Date of Onset: new onset; noticed this over time and has gotten worse  Diagnosis Precautions: none  Relevant co-morbidities and medications: migraines, Hashimoto's disease,   Relevant Tests: X-ray:  IMPRESSION:   1. Focal degenerative changes at C6-C7.      Medical/surgical history, medications and relevant tests have been reviewed.     SUBJECTIVE   Continues to have improved numbness and tingling, but now having more neck pain.   Current Pain: 4/10.    Functional Change: Continues to get back into her exercise routine due to feeling better and less sick. Less tinging in arm     OBJECTIVE   No measurements taken    Treatment   Therapeutic Exercise:   Exercise Repetitions Sets Position/Cues   Arm bike level 4 3 minutes forward/3 min retro 1 Cues for form   Median nerve glides with head movements with wrist extended    Cues for stretch intensity   Ulnar nerve glides   Cues for stretch intensity   Radial nerve glides   Cues for stretch intensity and form   Swiss ball reach (W pattern)   Cues for stretch intensity and form   Static wall standing   Cues for form   scapular retractions 15 1 Cuing for form     Ys, Ts, and Ws over swiss ball  15 each  1 Cuing for form     Corner stretch   Cues for stretch intensity   Low rows, shoulder extension/internal/external rotation with green band 10 each bilateral   1 Cuing for form     Supine over towel roll and with shoulder flexion and  horizontal abduction  15 each plus 3 minutes of just laying over towel  1 Cuing for form   Comments: activity with sets/reps performed    Manual Therapy:   Soft tissue massage to right upper trapezius with trigger point release-improved tension overall, tolerates more pressure  Suboccipital release-improved tension after  First rib mobilizations-improved mobility after  Posterior/anterior mobilization and side glides throughout cervical spine-hypomobility continues    Current Home Program (not performed this date except as noted above):   2/10/17 Ys, Ts, Ws against wall   2/17/17 low rows, shoulder extension/internal/external rotation   2/22/17 Ys, Ts, Ws over swiss ball    ASSESSMENT   Continues to have improved tension throughout cervical musculature. Numbness and tingling has also improved, but still will get it on occasion. Continued need for postural awareness and strengthening.     Pain after treatment: 2/10  Result of above outlined education: Verbalizes understanding, Demonstrates understanding and Needs reinforcement    Goals:        see evaluation     PLAN   Continue per plan of care, increasing postural awareness and improving cervical mobility. Postural strength-prone retraction. Progress note next session.     THERAPY DAILY BILLING   Primary Insurance: [place]  Secondary Insurance: N/A    Evaluation Procedures:  No evaluation codes were used on this date of service    Timed Procedures:  Manual Therapy, 15 minutes  Therapeutic Exercise, 25 minutes    Untimed Procedures:  No untimed codes were used on this date of service    Total Treatment Time: 40 minutes    Certification from: 2/3/2017 through:4/28/17  I certify the need for these services, furnished under this plan of treatment and while under my care   Physician signature on file

## 2023-09-13 ENCOUNTER — LAB VISIT (OUTPATIENT)
Dept: LAB | Facility: HOSPITAL | Age: 73
End: 2023-09-13
Attending: INTERNAL MEDICINE
Payer: MEDICARE

## 2023-09-13 ENCOUNTER — TELEPHONE (OUTPATIENT)
Dept: INTERNAL MEDICINE | Facility: CLINIC | Age: 73
End: 2023-09-13
Payer: MEDICARE

## 2023-09-13 DIAGNOSIS — R17 ELEVATED BILIRUBIN: ICD-10-CM

## 2023-09-13 LAB
BILIRUB SERPL-MCNC: 1.6 MG/DL
BILIRUBIN DIRECT+TOT PNL SERPL-MCNC: 0.5 MG/DL (ref 0–?)
BILIRUBIN DIRECT+TOT PNL SERPL-MCNC: 1.1 MG/DL (ref 0–0.8)

## 2023-09-13 PROCEDURE — 82248 BILIRUBIN DIRECT: CPT

## 2023-09-13 PROCEDURE — 36415 COLL VENOUS BLD VENIPUNCTURE: CPT

## 2023-09-13 PROCEDURE — 82247 BILIRUBIN TOTAL: CPT

## 2023-09-13 NOTE — TELEPHONE ENCOUNTER
----- Message from [place] [facility] LPN sent at 9/13/2023 11:51 AM CDT -----  Regarding: NELSON Tavares 9/20/23 @0840  Are there any outstanding tasks in the chart? Pt will need nonfasting labs    Is there any documentation of tasks? no    Has patient seen another physician, been to the ER, C, or admitted to hospital since last visit?    Has the patient done blood work or imaging since last visit?

## 2023-09-18 ENCOUNTER — OFFICE VISIT (OUTPATIENT)
Dept: HEMATOLOGY/ONCOLOGY | Facility: CLINIC | Age: 73
End: 2023-09-18
Attending: INTERNAL MEDICINE
Payer: MEDICARE

## 2023-09-18 VITALS
SYSTOLIC BLOOD PRESSURE: 124 MMHG | HEIGHT: 62 IN | HEART RATE: 65 BPM | WEIGHT: 189.81 LBS | RESPIRATION RATE: 18 BRPM | BODY MASS INDEX: 34.93 KG/M2 | DIASTOLIC BLOOD PRESSURE: 82 MMHG | OXYGEN SATURATION: 96 % | TEMPERATURE: 98 F

## 2023-09-18 DIAGNOSIS — Z85.3 HISTORY OF BREAST CANCER: Primary | ICD-10-CM

## 2023-09-18 DIAGNOSIS — Z12.31 SCREENING MAMMOGRAM FOR HIGH-RISK PATIENT: ICD-10-CM

## 2023-09-18 DIAGNOSIS — C50.911 BILATERAL MALIGNANT NEOPLASM OF BREAST IN FEMALE, UNSPECIFIED ESTROGEN RECEPTOR STATUS, UNSPECIFIED SITE OF BREAST: ICD-10-CM

## 2023-09-18 DIAGNOSIS — C50.912 BILATERAL MALIGNANT NEOPLASM OF BREAST IN FEMALE, UNSPECIFIED ESTROGEN RECEPTOR STATUS, UNSPECIFIED SITE OF BREAST: ICD-10-CM

## 2023-09-18 PROCEDURE — 99214 PR OFFICE/OUTPT VISIT, EST, LEVL IV, 30-39 MIN: ICD-10-PCS | Mod: S$PBB,,, | Performed by: INTERNAL MEDICINE

## 2023-09-18 PROCEDURE — 99214 OFFICE O/P EST MOD 30 MIN: CPT | Mod: S$PBB,,, | Performed by: INTERNAL MEDICINE

## 2023-09-18 PROCEDURE — 99999 PR PBB SHADOW E&M-EST. PATIENT-LVL IV: CPT | Mod: PBBFAC,,, | Performed by: INTERNAL MEDICINE

## 2023-09-18 PROCEDURE — 99214 OFFICE O/P EST MOD 30 MIN: CPT | Mod: PBBFAC | Performed by: INTERNAL MEDICINE

## 2023-09-18 PROCEDURE — 99999 PR PBB SHADOW E&M-EST. PATIENT-LVL IV: ICD-10-PCS | Mod: PBBFAC,,, | Performed by: INTERNAL MEDICINE

## 2023-09-18 NOTE — PROGRESS NOTES
HEMATOLOGY/ONCOLOGY OFFICE CLINIC VISIT    Visit Information:    Initial Evaluation: 11/8/2005  Referring Provider:   Other providers:  Code status: Not addressed    Diagnosis:   1) Stage I left breast cancer diagnosed in 2004   - Left: lumpectomy 4/20/04 followed by XRT and Arimidex 2795-4115--> Evista x 3 yrs  2) Stage II right breast cancer diagnosed in 9/1989   - Right: Mastectomy followed by CAF x 6 in 1989    Present Treatment:  Surveillance    Imaging:  Mammogram 8/16/21: IMPRESSION: BENIGN There is no mammographic evidence of malignancy.  RECOMMENDATIONS:  A routine screening mammogram in one year in the absence of significant clinical findings in the interval is recommended (August 2022).    Mammogram 8/18/2022: IMPRESSION: BENIGN There is no mammographic evidence of malignancy. RECOMMENDATIONS: A routine screening mammogram in one year in the absence of significant clinical findings in the interval is recommended (August 2023).    Mammogram 8/23/2023: IMPRESSION: BENIGN  No mammographic evidence of malignancy.  RECOMMENDATIONS:   Recommend continued annual screening mammography with tomosynthesis. Next exam is due in August, 2024 unless clinical signs or symptoms warrant earlier evaluation.  Recommend annual supplemental breast screening with dynamic contrast enhanced breast MRI in this patient with a personal history of breast cancer diagnosed prior to age 50. Ideally, mammography and breast MRI are alternated every 6 months. To establish such a schedule, breast MRI would be due February, 2024.       Pathology:      CLINICAL HISTORY:       Patient: Ms Clarke was diagnosed with right breast cancer in September of 1989 for which she underwent right mastectomy in 9/1989 for at least stage II disease and took chemotherapy with CAF x 6 in [place], [place]. She developed Stage I left breast cancer in 2004 for which she had a lumpectomy 4/20/04 and radiation followed by Arimidex x 5 years and completed it 2009  "and then she was placed on Evista. I saw her for the first time on 11/8/2005 after moving from [place] following [place]. Medical records not accessible.     She has a colonoscopy back in 3/12/13 she is s/p polypectomy and pathology report revealed CECAL POLYP, BIOPSY: TUBULAR ADENOMA, SMALL FRAGMENT;  COLON;  NO EVIDENCE OF HIGH GRADE DYSPLASIA.  It will repeat in 3 year     During previous visits: She stated that she has been having pain in her legs at night time and she associated it with Requip that she just started for her restless leg syndrome. It also started when she increased the medication. She stopped the Requip already. She denies any swelling of her legs or any pain during the day time.   She lost weight intentionally with weight watchers, but has gain 20 lbs back. She told me that she was anxious and on 11/20/13 she saw Dr [facility] As per Dr Gomez note: The patient states that she has been seeing advertisements for various cancer centers that suggest a "team approach."  She states that she has never had "a team."  She reported to Dr. Estes that she has been having some difficulty with "hoarding" and it was suggested that the patient talk with Dr Gomez. She engages in hoarding behavior that is interfering with her relationship with her . Medical psychology has been consulted. She stated that she is doing better now. She has not been seen by Dr Gomez anymore and she is declining further visits with him at this time.    She is struggling with her weight and has been on different medications to see if this helps her. No weight loss with the Contrave.  She looked into lap band, but doesn't qualify.  She quit taking the Contrave. She then tried another prescription medication but too expensive therefore she did not take,Qsymia.       Chief Complaint: OTHER (No concerns today.)      Interval History:  Patient presents today for annual breast exam and follow up to discuss " "mammogram results. She admits to not having MRI because she did not know what was involved with the process and also because she has been dealing with  caring for her significant other, as he was recently diagnosed with liver cancer.  She has not had Ob/Gyn exam in more than 2 years, plans to contact Dr. Arce's office soon. She also reports she is overdue for colonoscopy and plans to contact her GI soon. She reports she has follow up with her PCP next week, she is following her elevated bilirubin. Overall, she is doing well. Denies fever, chills or sweats. No chest pain or shortness of breath. No bleeding.  Reviewed mammogram with her and recommendations for supplemental MRI because she was diagnosed before the age of 50. She agrees to proceed with the MRI.      ROS:  All 14 points ROS taken and positive as per Interval History, all other negative.    Histories:  PMH/PSH/FH/SOCIAL/ALLERGIES AND MEDS REVIEWED AND UPDATED AS APPROPRIATE       Vitals:    09/18/23 1254   BP: 124/82   BP Location: Left arm   Patient Position: Sitting   Pulse: 65   Resp: 18   Temp: 97.8 °F (36.6 °C)   TempSrc: Oral   SpO2: 96%   Weight: 86.1 kg (189 lb 12.8 oz)   Height: 5' 2" (1.575 m)        Physical Exam  Vitals and nursing note reviewed.   Constitutional:       General: She is not in acute distress.     Appearance: Normal appearance. She is well-developed.   HENT:      Head: Normocephalic and atraumatic.      Mouth/Throat:      Mouth: Mucous membranes are moist.   Eyes:      General: No scleral icterus.     Extraocular Movements: Extraocular movements intact.      Conjunctiva/sclera: Conjunctivae normal.      Pupils: Pupils are equal, round, and reactive to light.   Neck:      Vascular: No JVD.   Cardiovascular:      Rate and Rhythm: Normal rate and regular rhythm.      Heart sounds: No murmur heard.  Pulmonary:      Effort: Pulmonary effort is normal.      Breath sounds: Normal breath sounds. No wheezing or rhonchi.   Chest:      " Chest wall: No deformity or tenderness.   Breasts:     Right: No swelling, mass, skin change or tenderness.      Left: No swelling, mass, nipple discharge, skin change or tenderness.      Comments: Right reconstructed breast  Abdominal:      General: Bowel sounds are normal. There is no distension.      Palpations: Abdomen is soft. There is no mass.      Tenderness: There is no abdominal tenderness.   Musculoskeletal:         General: No swelling or deformity.      Cervical back: Neck supple.   Lymphadenopathy:      Cervical: No cervical adenopathy.      Upper Body:      Right upper body: No supraclavicular or axillary adenopathy.      Left upper body: No supraclavicular or axillary adenopathy.      Lower Body: No right inguinal adenopathy. No left inguinal adenopathy.   Skin:     General: Skin is warm.      Coloration: Skin is not jaundiced.      Findings: No lesion or rash.      Nails: There is no clubbing.   Neurological:      General: No focal deficit present.      Mental Status: She is alert and oriented to person, place, and time.      Sensory: Sensation is intact.      Motor: Motor function is intact.      Gait: Gait is intact.   Psychiatric:         Attention and Perception: Attention normal.         Mood and Affect: Mood and affect normal.         Speech: Speech normal.         Behavior: Behavior is cooperative.         Thought Content: Thought content normal.         Cognition and Memory: Cognition normal.         Judgment: Judgment normal.       ECOG SCORE    0 - Fully active-able to carry on all pre-disease performance without restriction         Laboratory:  CBC with Differential:  Lab Results   Component Value Date    WBC 6.54 09/18/2023    RBC 4.63 09/18/2023    HGB 12.6 09/18/2023    HCT 40.1 09/18/2023    MCV 86.6 09/18/2023    MCH 27.2 09/18/2023    MCHC 31.4 (L) 09/18/2023    RDW 13.0 09/18/2023     09/18/2023    MPV 8.7 09/18/2023        CMP:  Sodium Level   Date Value Ref Range Status    09/18/2023 137 136 - 145 mmol/L Final     Potassium Level   Date Value Ref Range Status   09/18/2023 4.2 3.5 - 5.1 mmol/L Final     Carbon Dioxide   Date Value Ref Range Status   09/18/2023 28 23 - 31 mmol/L Final     Blood Urea Nitrogen   Date Value Ref Range Status   09/18/2023 11.3 9.8 - 20.1 mg/dL Final     Creatinine   Date Value Ref Range Status   09/18/2023 0.82 0.55 - 1.02 mg/dL Final     Calcium Level Total   Date Value Ref Range Status   09/18/2023 9.4 8.4 - 10.2 mg/dL Final     Albumin Level   Date Value Ref Range Status   09/18/2023 4.3 3.4 - 4.8 g/dL Final     Bilirubin Total   Date Value Ref Range Status   09/18/2023 1.7 (H) <=1.5 mg/dL Final     Alkaline Phosphatase   Date Value Ref Range Status   09/18/2023 83 40 - 150 unit/L Final     Aspartate Aminotransferase   Date Value Ref Range Status   09/18/2023 20 5 - 34 unit/L Final     Alanine Aminotransferase   Date Value Ref Range Status   09/18/2023 17 0 - 55 unit/L Final     Estimated GFR-Non    Date Value Ref Range Status   07/29/2022 >60 mls/min/1.73/m2 Final         Assessment:       Bilateral breast cancer:  1) Stage I left breast cancer diagnosed in 2004   - Left: lumpectomy 4/20/04 followed by XRT and Arimidex 8319-9620--> Evista x 3 yrs  2) Stage II right breast cancer diagnosed in 9/1989   - Right: Mastectomy followed by CAF x 6 in 1989      Plan:       1. History of breast cancer    2. Screening mammogram for high-risk patient    3. Bilateral malignant neoplasm of breast in female, unspecified estrogen receptor status, unspecified site of breast        No clinical evidence of recurrence.  Recommend follow up with Ob/Gyn--Dr. Arce  Recommend follow up with GI  [facility] screening mammogram due in 8/2024--will defer ordering to PCP annually  MRI left breast in 6 months as recommended by breast radiologist - due 2/2024, ordered  RTC 12 months or earlier if needed with labs.   Labs: CBC, CMP    Instructed to call for any questions  or problems  The patient was given ample opportunity to ask questions and they were all answered to satisfaction; patient demonstrated understanding of what we discussed and is agreeable to the plan.    YUNG MILLS MD      Professional Services   I, Herminia Betts LPN, acted solely as a scribe for and in the presence of Dr. Yung Mills, who performed these services.

## 2023-09-20 ENCOUNTER — OFFICE VISIT (OUTPATIENT)
Dept: INTERNAL MEDICINE | Facility: CLINIC | Age: 73
End: 2023-09-20
Payer: MEDICARE

## 2023-09-20 VITALS
RESPIRATION RATE: 18 BRPM | DIASTOLIC BLOOD PRESSURE: 78 MMHG | OXYGEN SATURATION: 96 % | BODY MASS INDEX: 34.96 KG/M2 | HEIGHT: 62 IN | WEIGHT: 190 LBS | SYSTOLIC BLOOD PRESSURE: 134 MMHG | HEART RATE: 69 BPM

## 2023-09-20 DIAGNOSIS — R17 ELEVATED BILIRUBIN: ICD-10-CM

## 2023-09-20 DIAGNOSIS — Z23 FLU VACCINE NEED: Primary | ICD-10-CM

## 2023-09-20 DIAGNOSIS — F41.1 GENERALIZED ANXIETY DISORDER: ICD-10-CM

## 2023-09-20 PROCEDURE — G0008 ADMIN INFLUENZA VIRUS VAC: HCPCS | Mod: ,,, | Performed by: INTERNAL MEDICINE

## 2023-09-20 PROCEDURE — 90694 VACC AIIV4 NO PRSRV 0.5ML IM: CPT | Mod: ,,, | Performed by: INTERNAL MEDICINE

## 2023-09-20 PROCEDURE — G0008 FLU VACCINE - QUADRIVALENT - ADJUVANTED: ICD-10-PCS | Mod: ,,, | Performed by: INTERNAL MEDICINE

## 2023-09-20 PROCEDURE — 99214 OFFICE O/P EST MOD 30 MIN: CPT | Mod: ,,, | Performed by: INTERNAL MEDICINE

## 2023-09-20 PROCEDURE — 90694 FLU VACCINE - QUADRIVALENT - ADJUVANTED: ICD-10-PCS | Mod: ,,, | Performed by: INTERNAL MEDICINE

## 2023-09-20 PROCEDURE — 99214 PR OFFICE/OUTPT VISIT, EST, LEVL IV, 30-39 MIN: ICD-10-PCS | Mod: ,,, | Performed by: INTERNAL MEDICINE

## 2023-09-20 RX ORDER — BUPROPION HYDROCHLORIDE 300 MG/1
300 TABLET ORAL DAILY
Qty: 30 TABLET | Refills: 11 | Status: SHIPPED | OUTPATIENT
Start: 2023-09-20 | End: 2024-03-22

## 2023-09-20 NOTE — ASSESSMENT & PLAN NOTE
Overall its better than the last time.  And the fractionation shows only minimal elevation.  Discussed with patient imaging vs. Recheck.  Plan will be to recheck 6 mos.

## 2023-09-20 NOTE — PROGRESS NOTES
Subjective:      Chief Complaint: Follow-up (6wk/Discuss labs )      HPI:She is here for f/u anxiety and elevated bilirubin.  She feels the anxiety is a little better.  She is still taking the xanax daily.  She is depressed at times with her  going thru cancer treatment.  She would like to try increasing the Wellbutrin.    Problem Noted   Encounter for Medicare Annual Wellness Exam 8/4/2022   Elevated Bilirubin 8/10/2023   Generalized Anxiety Disorder 8/4/2022   History of Breast Cancer 8/4/2022    followed by Dr. Graham     Hyperlipidemia 8/4/2022   Obesity 8/4/2022   Osteoarthritis of Left Knee 8/4/2022   Osteopenia 8/4/2022   Restless Legs Syndrome 8/4/2022   Overactive Bladder 8/4/2022        The patient's Health Maintenance was reviewed and the following appears to be due:   Health Maintenance Due   Topic Date Due    TETANUS VACCINE  09/01/2015    DEXA Scan  07/30/2022    COVID-19 Vaccine (4 - Pfizer series) 12/13/2022       Past Medical History:  Past Medical History:   Diagnosis Date    Breast cancer     Depression     H/O female dyspareunia     Personal history of colonic polyps     Restless leg syndrome      Review of patient's allergies indicates:   Allergen Reactions    Ciprofloxacin     Poison ivy extract      Current Outpatient Medications on File Prior to Visit   Medication Sig Dispense Refill    ALPRAZolam (XANAX) 0.5 MG tablet Take 1 tablet (0.5 mg total) by mouth 2 (two) times daily. as needed for anxiety. 30 tablet 0    atorvastatin (LIPITOR) 20 MG tablet Take 1 tablet (20 mg total) by mouth every evening. 90 tablet 0    [DISCONTINUED] buPROPion (WELLBUTRIN XL) 150 MG TB24 tablet Take 1 tablet (150 mg total) by mouth once daily. 30 tablet 3    betamethasone valerate 0.1% (VALISONE) 0.1 % Crea Apply topically 3 (three) times daily as needed.       No current facility-administered medications on file prior to visit.       Review of Systems    Objective:   /78 (BP Location: Left arm,  "Patient Position: Sitting, BP Method: Small (Manual))   Pulse 69   Resp 18   Ht 5' 2.01" (1.575 m)   Wt 86.2 kg (190 lb)   SpO2 96%   BMI 34.74 kg/m²     Physical Exam  Vitals reviewed.   Constitutional:       General: She is not in acute distress.     Appearance: Normal appearance. She is not ill-appearing or diaphoretic.   HENT:      Head: Normocephalic and atraumatic.   Pulmonary:      Effort: Pulmonary effort is normal.   Skin:     General: Skin is warm and dry.   Neurological:      General: No focal deficit present.      Mental Status: She is alert.   Psychiatric:         Mood and Affect: Mood normal.         Behavior: Behavior normal.         Thought Content: Thought content normal.         Judgment: Judgment normal.       Assessment and Plan:     Elevated bilirubin  Overall its better than the last time.  And the fractionation shows only minimal elevation.  Discussed with patient imaging vs. Recheck.  Plan will be to recheck 6 mos.    Generalized anxiety disorder  Increase Wellbutrin to 300 mg daily.        Follow up in about 6 months (around 3/20/2024).    Medications Ordered This Encounter   Medications    buPROPion (WELLBUTRIN XL) 300 MG 24 hr tablet     Sig: Take 1 tablet (300 mg total) by mouth once daily.     Dispense:  30 tablet     Refill:  11     [unfilled]  Orders Placed This Encounter   Procedures    Influenza (FLUAD) - Quadrivalent (Adjuvanted) *Preferred* (65+) (PF)    Comprehensive Metabolic Panel     Standing Status:   Future     Standing Expiration Date:   3/20/2025       "

## 2023-10-25 RX ORDER — ALPRAZOLAM 0.5 MG/1
0.5 TABLET ORAL 2 TIMES DAILY
Qty: 30 TABLET | Refills: 0 | Status: SHIPPED | OUTPATIENT
Start: 2023-10-25 | End: 2023-11-27

## 2023-11-13 PROBLEM — Z00.00 ENCOUNTER FOR MEDICARE ANNUAL WELLNESS EXAM: Status: RESOLVED | Noted: 2022-08-04 | Resolved: 2023-11-13

## 2023-11-27 RX ORDER — ALPRAZOLAM 0.5 MG/1
0.5 TABLET ORAL 2 TIMES DAILY
Qty: 30 TABLET | Refills: 0 | Status: SHIPPED | OUTPATIENT
Start: 2023-11-27 | End: 2024-01-02

## 2023-11-28 DIAGNOSIS — E78.5 HYPERLIPIDEMIA, UNSPECIFIED HYPERLIPIDEMIA TYPE: ICD-10-CM

## 2023-11-28 RX ORDER — ATORVASTATIN CALCIUM 20 MG/1
20 TABLET, FILM COATED ORAL NIGHTLY
Qty: 90 TABLET | Refills: 3 | Status: SHIPPED | OUTPATIENT
Start: 2023-11-28

## 2024-01-02 RX ORDER — ALPRAZOLAM 0.5 MG/1
0.5 TABLET ORAL 2 TIMES DAILY
Qty: 30 TABLET | Refills: 0 | Status: SHIPPED | OUTPATIENT
Start: 2024-01-02 | End: 2024-02-12

## 2024-01-08 ENCOUNTER — OFFICE VISIT (OUTPATIENT)
Dept: URGENT CARE | Facility: CLINIC | Age: 74
End: 2024-01-08
Payer: MEDICARE

## 2024-01-08 VITALS
WEIGHT: 190 LBS | TEMPERATURE: 100 F | DIASTOLIC BLOOD PRESSURE: 82 MMHG | HEART RATE: 75 BPM | BODY MASS INDEX: 34.96 KG/M2 | HEIGHT: 62 IN | SYSTOLIC BLOOD PRESSURE: 122 MMHG | RESPIRATION RATE: 16 BRPM | OXYGEN SATURATION: 97 %

## 2024-01-08 DIAGNOSIS — J06.9 VIRAL URI: ICD-10-CM

## 2024-01-08 DIAGNOSIS — R05.9 COUGH, UNSPECIFIED TYPE: Primary | ICD-10-CM

## 2024-01-08 LAB
CTP QC/QA: YES
CTP QC/QA: YES
POC MOLECULAR INFLUENZA A AGN: NEGATIVE
POC MOLECULAR INFLUENZA B AGN: NEGATIVE
SARS-COV-2 RDRP RESP QL NAA+PROBE: NEGATIVE

## 2024-01-08 PROCEDURE — 99213 OFFICE O/P EST LOW 20 MIN: CPT | Mod: ,,, | Performed by: NURSE PRACTITIONER

## 2024-01-08 PROCEDURE — 87635 SARS-COV-2 COVID-19 AMP PRB: CPT | Mod: QW,,, | Performed by: NURSE PRACTITIONER

## 2024-01-08 PROCEDURE — 87502 INFLUENZA DNA AMP PROBE: CPT | Mod: QW,,, | Performed by: NURSE PRACTITIONER

## 2024-01-08 NOTE — PROGRESS NOTES
"Subjective:      Patient ID: Sarah Clarke is a 73 y.o. female.    Vitals:  height is 5' 2.01" (1.575 m) and weight is 86.2 kg (190 lb). Her temperature is 99.5 °F (37.5 °C). Her blood pressure is 122/82 and her pulse is 75. Her respiration is 16 and oxygen saturation is 97%.     Chief Complaint: Other (2 days loss of voice, cough, thick phlegm. )    This is a 73-year-old female presents to the urgent care with a 3-4 day history of postnasal drip sinus irritation and hoarse voice that is improved but still wished to have evaluated.  Denies any fever body aches loss of taste loss of smell nausea or vomiting or diarrhea.  Has been using over-the-counter cough medications were daytime and nighttime usage with moderate alleviation of symptoms.        Constitution: Negative for fever.   HENT:  Positive for postnasal drip, sinus pressure and voice change. Negative for sore throat and trouble swallowing.    Respiratory:  Negative for cough.       Objective:     Physical Exam   Constitutional: She is oriented to person, place, and time. She appears well-developed. She is cooperative.  Non-toxic appearance. She does not appear ill. No distress.   HENT:   Head: Normocephalic and atraumatic.   Ears:   Right Ear: Hearing, tympanic membrane, external ear and ear canal normal.   Left Ear: Hearing, tympanic membrane, external ear and ear canal normal.   Nose: Nose normal. No mucosal edema, rhinorrhea or nasal deformity. No epistaxis. Right sinus exhibits no maxillary sinus tenderness and no frontal sinus tenderness. Left sinus exhibits no maxillary sinus tenderness and no frontal sinus tenderness.   Mouth/Throat: Uvula is midline, oropharynx is clear and moist and mucous membranes are normal. No trismus in the jaw. Normal dentition. No uvula swelling. No oropharyngeal exudate, posterior oropharyngeal edema or posterior oropharyngeal erythema.   Eyes: Conjunctivae and lids are normal. No scleral icterus.   Neck: Trachea " "normal and phonation normal. Neck supple. No edema present. No erythema present. No neck rigidity present.   Cardiovascular: Normal rate, regular rhythm, normal heart sounds and normal pulses.   Pulmonary/Chest: Effort normal and breath sounds normal. No respiratory distress. She has no decreased breath sounds. She has no rhonchi.   Abdominal: Normal appearance.   Musculoskeletal: Normal range of motion.         General: No deformity. Normal range of motion.   Neurological: She is alert and oriented to person, place, and time. She exhibits normal muscle tone. Coordination normal.   Skin: Skin is warm, dry, intact, not diaphoretic and not pale.   Psychiatric: Her speech is normal and behavior is normal. Judgment and thought content normal.   Nursing note and vitals reviewed.         Previous History      Review of patient's allergies indicates:   Allergen Reactions    Ciprofloxacin     Poison ivy extract        Past Medical History:   Diagnosis Date    Breast cancer     Depression     H/O female dyspareunia     Personal history of colonic polyps     Restless leg syndrome      Current Outpatient Medications   Medication Instructions    ALPRAZolam (XANAX) 0.5 mg, Oral, 2 times daily, as needed for anxiety.    atorvastatin (LIPITOR) 20 mg, Oral, Nightly    betamethasone valerate 0.1% (VALISONE) 0.1 % Crea Topical (Top), 3 times daily PRN    buPROPion (WELLBUTRIN XL) 300 mg, Oral, Daily     Past Surgical History:   Procedure Laterality Date    BREAST LUMPECTOMY      BREAST RECONSTRUCTION      COLONOSCOPY  05/16/2018    MASTECTOMY      WISDOM TOOTH EXTRACTION           Social History     Tobacco Use    Smoking status: Never    Smokeless tobacco: Never   Substance Use Topics    Alcohol use: Not Currently    Drug use: Never        Physical Exam      Vital Signs Reviewed   /82   Pulse 75   Temp 99.5 °F (37.5 °C)   Resp 16   Ht 5' 2.01" (1.575 m)   Wt 86.2 kg (190 lb)   SpO2 97%   BMI 34.74 kg/m²        " Procedures    Procedures     Labs     Results for orders placed or performed in visit on 01/08/24   POCT COVID-19 Rapid Screening   Result Value Ref Range    POC Rapid COVID Negative Negative     Acceptable Yes    POCT Influenza A/B Molecular   Result Value Ref Range    POC Molecular Influenza A Ag Negative Negative, Not Reported    POC Molecular Influenza B Ag Negative Negative, Not Reported     Acceptable Yes       Assessment:     1. Cough, unspecified type    2. Viral URI        Plan:     Start taking an allergy pill daily such as claritin, zyrtec, allegrea or xyzal. Also start using a nasal steroid spray such as flonase or nasacort daily. If you are not being treated for high blood pressure, you can also take decongestant such as sudafed as needed. They can be purchased over the counter.  Monitor for fever. Take tylenol/acetaminophen or ibuprofen as needed. Rest and hydrate. If symptoms persist or worsen, return to clinic or seek medical attention immediately.   Cough, unspecified type  -     POCT COVID-19 Rapid Screening  -     POCT Influenza A/B Molecular    Viral URI

## 2024-01-08 NOTE — PATIENT INSTRUCTIONS
Start taking an allergy pill daily such as claritin, zyrtec, allegrea or xyzal. Also start using a nasal steroid spray such as flonase or nasacort daily. If you are not being treated for high blood pressure, you can also take decongestant such as sudafed as needed. They can be purchased over the counter. Monitor for fever. Take tylenol/acetaminophen or ibuprofen as needed. Rest and hydrate. If symptoms persist or worsen, return to clinic or seek medical attention immediately.

## 2024-01-11 ENCOUNTER — PATIENT MESSAGE (OUTPATIENT)
Dept: RESEARCH | Facility: HOSPITAL | Age: 74
End: 2024-01-11
Payer: MEDICARE

## 2024-02-12 RX ORDER — ALPRAZOLAM 0.5 MG/1
0.5 TABLET ORAL 2 TIMES DAILY
Qty: 30 TABLET | Refills: 0 | Status: SHIPPED | OUTPATIENT
Start: 2024-02-12 | End: 2024-04-11 | Stop reason: SDUPTHER

## 2024-03-04 ENCOUNTER — OFFICE VISIT (OUTPATIENT)
Dept: HEMATOLOGY/ONCOLOGY | Facility: CLINIC | Age: 74
End: 2024-03-04
Payer: MEDICARE

## 2024-03-04 VITALS
RESPIRATION RATE: 19 BRPM | BODY MASS INDEX: 33.4 KG/M2 | WEIGHT: 181.5 LBS | TEMPERATURE: 98 F | HEART RATE: 71 BPM | SYSTOLIC BLOOD PRESSURE: 125 MMHG | HEIGHT: 62 IN | DIASTOLIC BLOOD PRESSURE: 84 MMHG | OXYGEN SATURATION: 95 %

## 2024-03-04 DIAGNOSIS — K76.9 LIVER LESION: ICD-10-CM

## 2024-03-04 DIAGNOSIS — Z85.3 HISTORY OF BREAST CANCER: Primary | ICD-10-CM

## 2024-03-04 PROCEDURE — 99214 OFFICE O/P EST MOD 30 MIN: CPT | Mod: PBBFAC | Performed by: INTERNAL MEDICINE

## 2024-03-04 PROCEDURE — 99215 OFFICE O/P EST HI 40 MIN: CPT | Mod: S$PBB,,, | Performed by: INTERNAL MEDICINE

## 2024-03-04 PROCEDURE — 99999 PR PBB SHADOW E&M-EST. PATIENT-LVL IV: CPT | Mod: PBBFAC,,, | Performed by: INTERNAL MEDICINE

## 2024-03-04 NOTE — PROGRESS NOTES
HEMATOLOGY/ONCOLOGY OFFICE CLINIC VISIT    Visit Information:    Initial Evaluation: 11/8/2005  Referring Provider:   Other providers:  Code status: Not addressed    Diagnosis:   1) Stage I left breast cancer diagnosed in 2004   - Left: lumpectomy 4/20/04 followed by XRT and Arimidex [id number]--> Evista x 3 yrs  2) Stage II right breast cancer diagnosed in 9/1989   - Right: Mastectomy followed by CAF x 6 in 1989    Present Treatment:  Surveillance    Imaging:  Mammogram 8/16/21: IMPRESSION: BENIGN There is no mammographic evidence of malignancy.  RECOMMENDATIONS:  A routine screening mammogram in one year in the absence of significant clinical findings in the interval is recommended (August 2022).    Mammogram 8/18/2022: IMPRESSION: BENIGN There is no mammographic evidence of malignancy. RECOMMENDATIONS: A routine screening mammogram in one year in the absence of significant clinical findings in the interval is recommended (August 2023).    Mammogram 8/23/2023: IMPRESSION: BENIGN  No mammographic evidence of malignancy.  RECOMMENDATIONS:   Recommend continued annual screening mammography with tomosynthesis. Next exam is due in August, 2024 unless clinical signs or symptoms warrant earlier evaluation.  Recommend annual supplemental breast screening with dynamic contrast enhanced breast MRI in this patient with a personal history of breast cancer diagnosed prior to age 50. Ideally, mammography and breast MRI are alternated every 6 months. To establish such a schedule, breast MRI would be due February, 2024.  MRI Breast 2/29/2024:  IMPRESSION: BENIGN   1) No MRI evidence of malignancy in either breast.   2) Three subcentimeter T2 bright circumscribed hepatic lesions in the right lobe of the liver, nonspecific.   RECOMMENDATIONS: Recommend continued annual screening mammography with tomosynthesis. Next exam is due in August, 2024 unless clinical signs or symptoms warrant earlier evaluation.  Recommend annual  "supplemental breast screening with dynamic contrast enhanced breast MRI in this patient with a personal history of bilateral breast cancers, one of which was diagnosed prior to age 50.  Next exam is due in February, 2025.  Given the patient's history of primary breast carcinoma, recommend further hepatic imaging with multiphasic cross-sectional imaging (CT or MRI) utilizing a liver mass protocol.         Pathology:  Not available    CLINICAL HISTORY:       Patient: Ms Clarke was diagnosed with right breast cancer in September of 1989 for which she underwent right mastectomy in 9/1989 for at least stage II disease and took chemotherapy with CAF x 6 in [place], [place]. She developed Stage I left breast cancer in 2004 for which she had a lumpectomy 4/20/04 and radiation followed by Arimidex x 5 years and completed it 2009 and then she was placed on Evista. I saw her for the first time on 11/8/2005 after moving from [place] following [place]. Medical records not accessible.     She has a colonoscopy back in 3/12/13 she is s/p polypectomy and pathology report revealed CECAL POLYP, BIOPSY: TUBULAR ADENOMA, SMALL FRAGMENT;  COLON;  NO EVIDENCE OF HIGH GRADE DYSPLASIA.  It will repeat in 3 year     During previous visits: She stated that she has been having pain in her legs at night time and she associated it with Requip that she just started for her restless leg syndrome. It also started when she increased the medication. She stopped the Requip already. She denies any swelling of her legs or any pain during the day time.   She lost weight intentionally with weight watchers, but has gain 20 lbs back. She told me that she was anxious and on 11/20/13 she [facility] As per Dr Gomez note: The patient states that she has been seeing advertisements for various cancer centers that suggest a "team approach."  She states that she has never had "a team."  She reported to Dr. Estes that she has been " "having some difficulty with "hoarding" and it was suggested that the patient talk with Dr Gomez. She engages in hoarding behavior that is interfering with her relationship with her . Medical psychology has been consulted. She stated that she is doing better now. She has not been seen by Dr Gomez anymore and she is declining further visits with him at this time.    She is struggling with her weight and has been on different medications to see if this helps her. No weight loss with the Contrave.  She looked into lap band, but doesn't qualify.  She quit taking the Contrave. She then tried another prescription medication but too expensive therefore she did not take,Qsymia.       Chief Complaint: 6 Month Follow Up (Scan Results.)      Interval History:  Patient presents today in follow-up of her breast cancer and to discuss MRI results.    Last visit:  She admits to not having MRI because she did not know what was involved with the process and also because she has been dealing with  caring for her significant other, as he was recently diagnosed with liver cancer.  She has not had Ob/Gyn exam in more than 2 years, plans to contact Dr. Arce's office soon. She also reports she is overdue for colonoscopy and plans to contact her GI soon. She reports she has follow up with her PCP next week, she is following her elevated bilirubin. Overall, she is doing well. Denies fever, chills or sweats. No chest pain or shortness of breath. No bleeding.  Reviewed mammogram with her and recommendations for supplemental MRI again during that visit because she was diagnosed before the age of 50. She agrees to proceed with the MRI.  Since then, her significant other passed away.  She is grieving in coming tears at times.  She underwent MRI of the breast but was not a pleasant experience for her.  We discussed again today the recommendations for MRIs of the breast.  We discussed MRI of the breast results in detail.  It showed 3 " "subcentimeter liver lesions.  We discussed CT, MRI and ultrasounds for further evaluation.  We also review blood work.  CMP today pending but she has history of intermittent increased bilirubin since at least 2016, mostly indirect bili.  Blood count has been always normal.    ROS: All 14 points ROS taken and positive as per Interval History, all other negative.  Review of Systems   Constitutional:  Negative for chills, fever and weight loss.   HENT:  Negative for congestion and nosebleeds.    Eyes:  Negative for blurred vision, double vision and photophobia.   Respiratory:  Negative for cough, hemoptysis and shortness of breath.    Cardiovascular:  Negative for chest pain, palpitations, leg swelling and PND.   Gastrointestinal:  Negative for abdominal pain, blood in stool, constipation, diarrhea, melena, nausea and vomiting.   Genitourinary:  Negative for dysuria, frequency, hematuria and urgency.   Musculoskeletal:  Negative for back pain, falls and myalgias.   Skin:  Negative for itching and rash.   Neurological:  Negative for tremors, focal weakness, seizures, weakness and headaches.   Endo/Heme/Allergies:  Negative for environmental allergies. Does not bruise/bleed easily.   Psychiatric/Behavioral:  Positive for depression. Negative for suicidal ideas. The patient is nervous/anxious.          Histories:  PMH/PSH/FH/SOCIAL/ALLERGIES AND MEDS REVIEWED AND UPDATED AS APPROPRIATE       Vitals:    03/04/24 1251   BP: 125/84   BP Location: Left forearm   Patient Position: Sitting   Pulse: 71   Resp: 19   Temp: 98.3 °F (36.8 °C)   TempSrc: Oral   SpO2: 95%   Weight: 82.3 kg (181 lb 8 oz)   Height: 5' 2.01" (1.575 m)          Physical Exam  Vitals and nursing note reviewed.   Constitutional:       General: She is not in acute distress.     Appearance: Normal appearance. She is well-developed.   HENT:      Head: Normocephalic and atraumatic.      Mouth/Throat:      Mouth: Mucous membranes are moist.   Eyes:      General: " No scleral icterus.     Extraocular Movements: Extraocular movements intact.      Conjunctiva/sclera: Conjunctivae normal.      Pupils: Pupils are equal, round, and reactive to light.   Neck:      Vascular: No JVD.   Cardiovascular:      Rate and Rhythm: Normal rate and regular rhythm.      Heart sounds: No murmur heard.  Pulmonary:      Effort: Pulmonary effort is normal.      Breath sounds: Normal breath sounds. No wheezing or rhonchi.   Chest:      Chest wall: No deformity or tenderness.   Breasts:     Right: Absent.      Left: Normal. No swelling, mass, nipple discharge, skin change or tenderness.      Comments: Right reconstructed breast  Left lumpectomy site well healed  Abdominal:      General: Bowel sounds are normal. There is no distension.      Palpations: Abdomen is soft. There is no mass.      Tenderness: There is no abdominal tenderness.   Musculoskeletal:         General: No swelling or deformity.      Cervical back: Neck supple.   Lymphadenopathy:      Cervical: No cervical adenopathy.      Upper Body:      Right upper body: No supraclavicular or axillary adenopathy.      Left upper body: No supraclavicular or axillary adenopathy.      Lower Body: No right inguinal adenopathy. No left inguinal adenopathy.   Skin:     General: Skin is warm.      Coloration: Skin is not jaundiced.      Findings: No lesion or rash.      Nails: There is no clubbing.   Neurological:      General: No focal deficit present.      Mental Status: She is alert and oriented to person, place, and time.      Sensory: Sensation is intact.      Motor: Motor function is intact.      Gait: Gait is intact.   Psychiatric:         Attention and Perception: Attention normal.         Mood and Affect: Mood and affect normal.         Speech: Speech normal.         Behavior: Behavior is cooperative.         Thought Content: Thought content normal.         Cognition and Memory: Cognition normal.         Judgment: Judgment normal.     ECOG SCORE     0 - Fully active-able to carry on all pre-disease performance without restriction         Laboratory:  CBC with Differential:  Lab Results   Component Value Date    WBC 6.19 03/04/2024    RBC 4.42 03/04/2024    HGB 12.2 03/04/2024    HCT 37.5 03/04/2024    MCV 84.8 03/04/2024    MCH 27.6 03/04/2024    MCHC 32.5 (L) 03/04/2024    RDW 12.7 03/04/2024     03/04/2024    MPV 9.0 03/04/2024        CMP:  Sodium Level   Date Value Ref Range Status   09/18/2023 137 136 - 145 mmol/L Final     Potassium Level   Date Value Ref Range Status   09/18/2023 4.2 3.5 - 5.1 mmol/L Final     Carbon Dioxide   Date Value Ref Range Status   09/18/2023 28 23 - 31 mmol/L Final     Blood Urea Nitrogen   Date Value Ref Range Status   09/18/2023 11.3 9.8 - 20.1 mg/dL Final     Creatinine   Date Value Ref Range Status   09/18/2023 0.82 0.55 - 1.02 mg/dL Final     Calcium Level Total   Date Value Ref Range Status   09/18/2023 9.4 8.4 - 10.2 mg/dL Final     Albumin Level   Date Value Ref Range Status   09/18/2023 4.3 3.4 - 4.8 g/dL Final     Bilirubin Total   Date Value Ref Range Status   09/18/2023 1.7 (H) <=1.5 mg/dL Final     Alkaline Phosphatase   Date Value Ref Range Status   09/18/2023 83 40 - 150 unit/L Final     Aspartate Aminotransferase   Date Value Ref Range Status   09/18/2023 20 5 - 34 unit/L Final     Alanine Aminotransferase   Date Value Ref Range Status   09/18/2023 17 0 - 55 unit/L Final     Estimated GFR-Non    Date Value Ref Range Status   07/29/2022 >60 mls/min/1.73/m2 Final         Assessment:       Bilateral breast cancer:  1) Stage I left breast cancer diagnosed in 2004   - Left: lumpectomy 4/20/04 followed by XRT and Arimidex [id number]--> Evista x 3 yrs  2) Stage II right breast cancer diagnosed in 9/1989   - Right: Mastectomy followed by CAF x 6 in 1989  3) Liver lesions seen on Breast MRI      Plan:       Schedule ab appt with  Ob/Gyn--Dr. Arce  Recommend follow up with GI--will call for  colonoscopy  Keep scheduled appointment for bone density on 3/7/24  She placed all this on hold because she was taking care of her fiance but she will reschedule.    [facility]id screening mammogram due in 8/2024--will order next visit.  MRI left breast in 6 months as recommended by breast radiologist - due 2/2025--will order when closer  US abdomen to eval liver lesions--may need CT or MRI, patient aware  RTC 2 weeks or earlier if needed to discuss results and +/- biopsy  No Labs  Patient was tearful gave presentation card of patient navigator, Guanakito Wren, so she can call her.  She was very appreciative.  At the time of her departure patient was doing better.      Instructed to call for any questions or problems  The patient was given ample opportunity to ask questions and they were all answered to satisfaction; patient demonstrated understanding of what we discussed and is agreeable to the plan.    YUNG MILLS MD

## 2024-03-07 ENCOUNTER — HOSPITAL ENCOUNTER (OUTPATIENT)
Dept: RADIOLOGY | Facility: HOSPITAL | Age: 74
Discharge: HOME | End: 2024-03-07
Attending: INTERNAL MEDICINE
Payer: MEDICARE

## 2024-03-07 DIAGNOSIS — Z78.0 MENOPAUSE: ICD-10-CM

## 2024-03-07 PROCEDURE — 77080 DXA BONE DENSITY AXIAL: CPT | Mod: 26,,, | Performed by: RADIOLOGY

## 2024-03-07 PROCEDURE — 77080 DXA BONE DENSITY AXIAL: CPT | Mod: TC

## 2024-03-13 ENCOUNTER — DOCUMENTATION ONLY (OUTPATIENT)
Dept: HEMATOLOGY/ONCOLOGY | Facility: CLINIC | Age: 74
End: 2024-03-13
Payer: MEDICARE

## 2024-03-13 NOTE — PROGRESS NOTES
Let her bone density is getting slightly worse.  She is on the verge of osteoporosis.  She has a couple of options. First would be lifestyle change if she hasn't already been doing that -- regular weight bearing exercise and make sure she is getting enough calcium and d.  Or if she wanted to be more aggressive, we could start her on a low dose bisphosphonate such as fosamax for osteoporosis prevention.  Its half the normal dose, taken once a week and with the same precaustions we usually recommended for the full dose fosamax.

## 2024-03-13 NOTE — NURSING
I spoke with Sarah. She explained when she was here last week seeing Dr. Graham she had a 'melt down' and Dr. Graham encouraged her to reach out to me. She talked about all she is going through and has gone through over the past 5 years. She is dealing with complicated grief. She does not want to do therapy face to face right now. She comes in to see Dr. Graham on 3/22/2024. I will see her before or after that visit and then put her on my schedule to call her monthly.

## 2024-03-19 ENCOUNTER — DOCUMENTATION ONLY (OUTPATIENT)
Dept: INTERNAL MEDICINE | Facility: CLINIC | Age: 74
End: 2024-03-19
Payer: MEDICARE

## 2024-03-19 LAB — CRC RECOMMENDATION EXT: NORMAL

## 2024-03-22 ENCOUNTER — CLINICAL SUPPORT (OUTPATIENT)
Dept: HEMATOLOGY/ONCOLOGY | Facility: CLINIC | Age: 74
End: 2024-03-22
Payer: MEDICARE

## 2024-03-22 ENCOUNTER — OFFICE VISIT (OUTPATIENT)
Dept: HEMATOLOGY/ONCOLOGY | Facility: CLINIC | Age: 74
End: 2024-03-22
Payer: MEDICARE

## 2024-03-22 VITALS
RESPIRATION RATE: 18 BRPM | HEIGHT: 62 IN | HEART RATE: 61 BPM | WEIGHT: 182.81 LBS | OXYGEN SATURATION: 96 % | TEMPERATURE: 98 F | DIASTOLIC BLOOD PRESSURE: 76 MMHG | BODY MASS INDEX: 33.64 KG/M2 | SYSTOLIC BLOOD PRESSURE: 120 MMHG

## 2024-03-22 DIAGNOSIS — Z85.3 HISTORY OF BREAST CANCER: Primary | ICD-10-CM

## 2024-03-22 DIAGNOSIS — Z12.31 SCREENING MAMMOGRAM FOR HIGH-RISK PATIENT: Primary | ICD-10-CM

## 2024-03-22 DIAGNOSIS — K76.9 LIVER LESION: ICD-10-CM

## 2024-03-22 DIAGNOSIS — Z85.3 HISTORY OF BREAST CANCER: ICD-10-CM

## 2024-03-22 PROCEDURE — 99213 OFFICE O/P EST LOW 20 MIN: CPT | Mod: PBBFAC | Performed by: INTERNAL MEDICINE

## 2024-03-22 PROCEDURE — 99999 PR PBB SHADOW E&M-EST. PATIENT-LVL III: CPT | Mod: PBBFAC,,, | Performed by: INTERNAL MEDICINE

## 2024-03-22 PROCEDURE — 99213 OFFICE O/P EST LOW 20 MIN: CPT | Mod: S$PBB,,, | Performed by: INTERNAL MEDICINE

## 2024-03-22 NOTE — PROGRESS NOTES
HEMATOLOGY/ONCOLOGY OFFICE CLINIC VISIT    Visit Information:    Initial Evaluation: 11/8/2005  Referring Provider:   Other providers:  Code status: Not addressed    Diagnosis:   1) Stage I left breast cancer diagnosed in 2004   - Left: lumpectomy 4/20/04 followed by XRT and Arimidex [id number]--> Evista x 3 yrs  2) Stage II right breast cancer diagnosed in 9/1989   - Right: Mastectomy followed by CAF x 6 in 1989    Present Treatment:  Surveillance    Imaging:  Mammogram 8/16/21: IMPRESSION: BENIGN There is no mammographic evidence of malignancy.  RECOMMENDATIONS:  A routine screening mammogram in one year in the absence of significant clinical findings in the interval is recommended (August 2022).    Mammogram 8/18/2022: IMPRESSION: BENIGN There is no mammographic evidence of malignancy. RECOMMENDATIONS: A routine screening mammogram in one year in the absence of significant clinical findings in the interval is recommended (August 2023).    Mammogram 8/23/2023: IMPRESSION: BENIGN  No mammographic evidence of malignancy.  RECOMMENDATIONS:   Recommend continued annual screening mammography with tomosynthesis. Next exam is due in August, 2024 unless clinical signs or symptoms warrant earlier evaluation.  Recommend annual supplemental breast screening with dynamic contrast enhanced breast MRI in this patient with a personal history of breast cancer diagnosed prior to age 50. Ideally, mammography and breast MRI are alternated every 6 months. To establish such a schedule, breast MRI would be due February, 2024.  MRI Breast 2/29/2024:  IMPRESSION: BENIGN   1) No MRI evidence of malignancy in either breast.   2) Three subcentimeter T2 bright circumscribed hepatic lesions in the right lobe of the liver, nonspecific.   RECOMMENDATIONS: Recommend continued annual screening mammography with tomosynthesis. Next exam is due in August, 2024 unless clinical signs or symptoms warrant earlier evaluation.  Recommend annual  supplemental breast screening with dynamic contrast enhanced breast MRI in this patient with a personal history of bilateral breast cancers, one of which was diagnosed prior to age 50.  Next exam is due in February, 2025.  Given the patient's history of primary breast carcinoma, recommend further hepatic imaging with multiphasic cross-sectional imaging (CT or MRI) utilizing a liver mass protocol.     Abdominal US 3/12/2024:   Two small echogenic lesions in the liver, most likely small hemangiomas.  No acute abdominal pathology identified.   Minimal gallbladder sludge at the fundus.       Pathology:  Not available    CLINICAL HISTORY:       Patient: Ms Clarke was diagnosed with right breast cancer in September of 1989 for which she underwent right mastectomy in 9/1989 for at least stage II disease and took chemotherapy with CAF x 6 in [place], [place]. She developed Stage I left breast cancer in 2004 for which she had a lumpectomy 4/20/04 and radiation followed by Arimidex x 5 years and completed it 2009 and then she was placed on Evista. I saw her for the first time on 11/8/2005 after moving from [place] following [place]. Medical records not accessible.     She has a colonoscopy back in 3/12/13 she is s/p polypectomy and pathology report revealed CECAL POLYP, BIOPSY: TUBULAR ADENOMA, SMALL FRAGMENT;  COLON;  NO EVIDENCE OF HIGH GRADE DYSPLASIA.  It will repeat in 3 year     During previous visits: She stated that she has been having pain in her legs at night time and she associated it with Requip that she just started for her restless leg syndrome. It also started when she increased the medication. She stopped the Requip already. She denies any swelling of her legs or any pain during the day time.   She lost weight intentionally with weight watchers, but has gain 20 lbs back. She told me that she was anxious and on 11/20/13 she [facility] As per Dr Gomez note: The patient states that  "she has been seeing advertisements for various cancer centers that suggest a "team approach."  She states that she has never had "a team."  She reported to Dr. Estes that she has been having some difficulty with "hoarding" and it was suggested that the patient talk with Dr Gomez. She engages in hoarding behavior that is interfering with her relationship with her . Medical psychology has been consulted. She stated that she is doing better now. She has not been seen by Dr Gomez anymore and she is declining further visits with him at this time.    She is struggling with her weight and has been on different medications to see if this helps her. No weight loss with the Contrave.  She looked into lap band, but doesn't qualify.  She quit taking the Contrave. She then tried another prescription medication but too expensive therefore she did not take,Qsymia.       Chief Complaint: 2 Weeks Follow Up (US Results.)      Interval History:  Patient presents today for follow-up to discuss abdominal US results. She reports having colonoscopy this week and everything was fine. She had a bone density on 3/7/24, showed Osteoporosis - is followed by her PCP.  She is due for screening mammogram 8/2024.      ROS: All 14 points ROS taken and positive as per Interval History, all other negative.  Review of Systems   Constitutional:  Negative for chills, fever and weight loss.   HENT:  Negative for congestion and nosebleeds.    Eyes:  Negative for blurred vision, double vision and photophobia.   Respiratory:  Negative for cough, hemoptysis and shortness of breath.    Cardiovascular:  Negative for chest pain, palpitations, leg swelling and PND.   Gastrointestinal:  Negative for abdominal pain, blood in stool, constipation, diarrhea, melena, nausea and vomiting.   Genitourinary:  Negative for dysuria, frequency, hematuria and urgency.   Musculoskeletal:  Negative for back pain, falls and myalgias.   Skin:  Negative for itching and " "rash.   Neurological:  Negative for tremors, focal weakness, seizures, weakness and headaches.   Endo/Heme/Allergies:  Negative for environmental allergies. Does not bruise/bleed easily.   Psychiatric/Behavioral:  Positive for depression. Negative for suicidal ideas. The patient is nervous/anxious.          Histories:  PMH/PSH/FH/SOCIAL/ALLERGIES AND MEDS REVIEWED AND UPDATED AS APPROPRIATE       Vitals:    03/22/24 1021   BP: 120/76   BP Location: Left arm   Patient Position: Sitting   Pulse: 61   Resp: 18   Temp: 98 °F (36.7 °C)   TempSrc: Oral   SpO2: 96%   Weight: 82.9 kg (182 lb 12.8 oz)   Height: 5' 2.01" (1.575 m)            Physical Exam  Vitals and nursing note reviewed.   Constitutional:       General: She is not in acute distress.     Appearance: Normal appearance. She is well-developed.   HENT:      Head: Normocephalic and atraumatic.      Mouth/Throat:      Mouth: Mucous membranes are moist.   Eyes:      General: No scleral icterus.     Extraocular Movements: Extraocular movements intact.      Conjunctiva/sclera: Conjunctivae normal.      Pupils: Pupils are equal, round, and reactive to light.   Neck:      Vascular: No JVD.   Cardiovascular:      Rate and Rhythm: Normal rate and regular rhythm.      Heart sounds: No murmur heard.  Pulmonary:      Effort: Pulmonary effort is normal.      Breath sounds: Normal breath sounds. No wheezing or rhonchi.   Chest:      Chest wall: No deformity or tenderness.   Breasts:     Right: Absent.      Left: Normal. No swelling, mass, nipple discharge, skin change or tenderness.      Comments: Right reconstructed breast  Left lumpectomy site well healed  Abdominal:      General: Bowel sounds are normal. There is no distension.      Palpations: Abdomen is soft. There is no mass.      Tenderness: There is no abdominal tenderness.   Musculoskeletal:         General: No swelling or deformity.      Cervical back: Neck supple.   Lymphadenopathy:      Cervical: No cervical " adenopathy.      Upper Body:      Right upper body: No supraclavicular or axillary adenopathy.      Left upper body: No supraclavicular or axillary adenopathy.      Lower Body: No right inguinal adenopathy. No left inguinal adenopathy.   Skin:     General: Skin is warm.      Coloration: Skin is not jaundiced.      Findings: No lesion or rash.      Nails: There is no clubbing.   Neurological:      General: No focal deficit present.      Mental Status: She is alert and oriented to person, place, and time.      Sensory: Sensation is intact.      Motor: Motor function is intact.      Gait: Gait is intact.   Psychiatric:         Attention and Perception: Attention normal.         Mood and Affect: Mood and affect normal.         Speech: Speech normal.         Behavior: Behavior is cooperative.         Thought Content: Thought content normal.         Cognition and Memory: Cognition normal.         Judgment: Judgment normal.       Laboratory:  CBC with Differential:  Lab Results   Component Value Date    WBC 6.19 03/04/2024    RBC 4.42 03/04/2024    HGB 12.2 03/04/2024    HCT 37.5 03/04/2024    MCV 84.8 03/04/2024    MCH 27.6 03/04/2024    MCHC 32.5 (L) 03/04/2024    RDW 12.7 03/04/2024     03/04/2024    MPV 9.0 03/04/2024        CMP:  Sodium Level   Date Value Ref Range Status   03/04/2024 140 136 - 145 mmol/L Final     Potassium Level   Date Value Ref Range Status   03/04/2024 4.1 3.5 - 5.1 mmol/L Final     Carbon Dioxide   Date Value Ref Range Status   03/04/2024 29 23 - 31 mmol/L Final     Blood Urea Nitrogen   Date Value Ref Range Status   03/04/2024 13.5 9.8 - 20.1 mg/dL Final     Creatinine   Date Value Ref Range Status   03/04/2024 0.75 0.55 - 1.02 mg/dL Final     Calcium Level Total   Date Value Ref Range Status   03/04/2024 9.5 8.4 - 10.2 mg/dL Final     Albumin Level   Date Value Ref Range Status   03/04/2024 4.1 3.4 - 4.8 g/dL Final     Bilirubin Total   Date Value Ref Range Status   03/04/2024 1.1 <=1.5  mg/dL Final     Alkaline Phosphatase   Date Value Ref Range Status   03/04/2024 95 40 - 150 unit/L Final     Aspartate Aminotransferase   Date Value Ref Range Status   03/04/2024 24 5 - 34 unit/L Final     Alanine Aminotransferase   Date Value Ref Range Status   03/04/2024 24 0 - 55 unit/L Final     Estimated GFR-Non    Date Value Ref Range Status   07/29/2022 >60 mls/min/1.73/m2 Final         Assessment:       Bilateral breast cancer:  1) Stage I left breast cancer diagnosed in 2004   - Left: lumpectomy 4/20/04 followed by XRT and Arimidex [id number]--> Evista x 3 yrs  2) Stage II right breast cancer diagnosed in 9/1989   - Right: Mastectomy followed by CAF x 6 in 1989  3) Liver lesions seen on Breast MRI  ---US abdomen c/w hemangiomas      Plan:       Schedule appt with  Ob/Gyn--Dr. Arce  Bone density on 3/7/24 showed Osteoporosis  - followed by PCP  [facility] screening mammogram due in 8/2024--ordered  MRI left breast in 6 months as recommended by breast radiologist - due 2/2025--will order when closer  RTC after MMG via telemedicine with MD - NO LABS    Instructed to call for any questions or problems  The patient was given ample opportunity to ask questions and they were all answered to satisfaction; patient demonstrated understanding of what we discussed and is agreeable to the plan.    Ena Graham MD  Hematology/Oncology      Professional Services   I, Herminia Betts LPN, acted solely as a scribe for and in the presence of Dr. Ena Graham, who performed these services.

## 2024-03-22 NOTE — NURSING
I met with Sarah following her appointment with Dr. Graham. I introduced myself and we discussed the details of her appointment. She feels better today after receiving good news. We talked briefly about her coping skills. She agreed to reach out if her coping skills are not providing relief.

## 2024-04-04 ENCOUNTER — LAB VISIT (OUTPATIENT)
Dept: LAB | Facility: HOSPITAL | Age: 74
End: 2024-04-04
Attending: INTERNAL MEDICINE
Payer: MEDICARE

## 2024-04-04 DIAGNOSIS — R17 ELEVATED BILIRUBIN: ICD-10-CM

## 2024-04-04 LAB
ALBUMIN SERPL-MCNC: 4.1 G/DL (ref 3.4–4.8)
ALBUMIN/GLOB SERPL: 1.5 RATIO (ref 1.1–2)
ALP SERPL-CCNC: 97 UNIT/L (ref 40–150)
ALT SERPL-CCNC: 27 UNIT/L (ref 0–55)
AST SERPL-CCNC: 25 UNIT/L (ref 5–34)
BILIRUB SERPL-MCNC: 1.4 MG/DL
BUN SERPL-MCNC: 16.1 MG/DL (ref 9.8–20.1)
CALCIUM SERPL-MCNC: 9.8 MG/DL (ref 8.4–10.2)
CHLORIDE SERPL-SCNC: 103 MMOL/L (ref 98–107)
CO2 SERPL-SCNC: 32 MMOL/L (ref 23–31)
CREAT SERPL-MCNC: 0.85 MG/DL (ref 0.55–1.02)
GFR SERPLBLD CREATININE-BSD FMLA CKD-EPI: >60 MLS/MIN/1.73/M2
GLOBULIN SER-MCNC: 2.7 GM/DL (ref 2.4–3.5)
GLUCOSE SERPL-MCNC: 100 MG/DL (ref 82–115)
POTASSIUM SERPL-SCNC: 4.6 MMOL/L (ref 3.5–5.1)
PROT SERPL-MCNC: 6.8 GM/DL (ref 5.8–7.6)
SODIUM SERPL-SCNC: 143 MMOL/L (ref 136–145)

## 2024-04-04 PROCEDURE — 36415 COLL VENOUS BLD VENIPUNCTURE: CPT

## 2024-04-04 PROCEDURE — 80053 COMPREHEN METABOLIC PANEL: CPT

## 2024-04-11 ENCOUNTER — OFFICE VISIT (OUTPATIENT)
Dept: INTERNAL MEDICINE | Facility: CLINIC | Age: 74
End: 2024-04-11
Payer: MEDICARE

## 2024-04-11 VITALS
WEIGHT: 186 LBS | OXYGEN SATURATION: 96 % | DIASTOLIC BLOOD PRESSURE: 72 MMHG | BODY MASS INDEX: 34.23 KG/M2 | RESPIRATION RATE: 18 BRPM | HEIGHT: 62 IN | HEART RATE: 77 BPM | SYSTOLIC BLOOD PRESSURE: 116 MMHG

## 2024-04-11 DIAGNOSIS — R17 ELEVATED BILIRUBIN: Primary | ICD-10-CM

## 2024-04-11 DIAGNOSIS — E78.5 HYPERLIPIDEMIA, UNSPECIFIED HYPERLIPIDEMIA TYPE: ICD-10-CM

## 2024-04-11 DIAGNOSIS — F41.1 GENERALIZED ANXIETY DISORDER: ICD-10-CM

## 2024-04-11 PROCEDURE — 99214 OFFICE O/P EST MOD 30 MIN: CPT | Mod: ,,, | Performed by: INTERNAL MEDICINE

## 2024-04-11 RX ORDER — ALPRAZOLAM 0.5 MG/1
0.5 TABLET ORAL 2 TIMES DAILY
Qty: 30 TABLET | Refills: 2 | Status: SHIPPED | OUTPATIENT
Start: 2024-04-11

## 2024-04-11 NOTE — ASSESSMENT & PLAN NOTE
Refill xanax.  She was concerned the Wellbutrin was causing hair loss.  I counseled her on trying to wean the xanax and not take daily.  She understands the risks.

## 2024-04-11 NOTE — PROGRESS NOTES
Subjective:      Chief Complaint: Follow-up (6mo/Discuss labs and BMD)      HPI:She is here for f/u anxiety, depression, elevated bilirubin.  She is grieving the loss of her fiancee.  She is taking care of things, but still grieving.  She stopped taking the Wellbutrin.  She hasn't really noticed a major difference in how she is feeling mentally.  She felt like it might be causing some hair loss.  She is taking the xanax when she leaves the house.  But sometimes she doesn't take so that she can be mentally present.  She is taking it at least one daily -- occ half.    She was having some sleep issues, but that is improving.    She had an u/s recently that showed some sludge  in her gall bladder.  She's had 5 episodes over the past 24 years of severe pain in her right shoulder blade with nausea.  The episodes of been brief and self limiting.  Problem Noted   Elevated Bilirubin 8/10/2023   Generalized Anxiety Disorder 8/4/2022   History of Breast Cancer 8/4/2022    followed by Dr. Graham     Hyperlipidemia 8/4/2022   Obesity 8/4/2022   Osteoarthritis of Left Knee 8/4/2022   Osteopenia 8/4/2022   Restless Legs Syndrome 8/4/2022   Overactive Bladder 8/4/2022   Encounter for Medicare Annual Wellness Exam (Resolved) 8/4/2022        The patient's Health Maintenance was reviewed and the following appears to be due:   Health Maintenance Due   Topic Date Due    TETANUS VACCINE  09/01/2015    COVID-19 Vaccine (5 - 2023-24 season) 11/20/2023       Past Medical History:  Past Medical History:   Diagnosis Date    Breast cancer     Depression     H/O female dyspareunia     Personal history of colonic polyps     Restless leg syndrome      Review of patient's allergies indicates:   Allergen Reactions    Ciprofloxacin     Poison ivy extract      Current Outpatient Medications on File Prior to Visit   Medication Sig Dispense Refill    atorvastatin (LIPITOR) 20 MG tablet TAKE 1 TABLET BY MOUTH ONCE DAILY IN THE EVENING 90 tablet 3     "betamethasone valerate 0.1% (VALISONE) 0.1 % Crea Apply topically 3 (three) times daily as needed.      [DISCONTINUED] ALPRAZolam (XANAX) 0.5 MG tablet Take 1 tablet by mouth twice daily as needed for anxiety 30 tablet 0    [DISCONTINUED] buPROPion (WELLBUTRIN XL) 300 MG 24 hr tablet Take 1 tablet (300 mg total) by mouth once daily. (Patient not taking: Reported on 3/22/2024) 30 tablet 11     No current facility-administered medications on file prior to visit.       Review of Systems    Objective:   /72 (BP Location: Left arm, Patient Position: Sitting, BP Method: Large (Manual))   Pulse 77   Resp 18   Ht 5' 2.01" (1.575 m)   Wt 84.4 kg (186 lb)   SpO2 96%   BMI 34.01 kg/m²     Physical Exam  Vitals reviewed.   Constitutional:       General: She is not in acute distress.     Appearance: Normal appearance. She is not ill-appearing or diaphoretic.   HENT:      Head: Normocephalic and atraumatic.   Pulmonary:      Effort: Pulmonary effort is normal.   Skin:     General: Skin is warm and dry.   Neurological:      General: No focal deficit present.      Mental Status: She is alert.   Psychiatric:         Mood and Affect: Mood normal.         Behavior: Behavior normal.         Thought Content: Thought content normal.         Judgment: Judgment normal.       Assessment and Plan:     Elevated bilirubin  This has resolved.    Generalized anxiety disorder  Refill xanax.  She was concerned the Wellbutrin was causing hair loss.  I counseled her on trying to wean the xanax and not take daily.  She understands the risks.      Follow up in about 6 months (around 10/11/2024).    Medications Ordered This Encounter   Medications    ALPRAZolam (XANAX) 0.5 MG tablet     Sig: Take 1 tablet (0.5 mg total) by mouth 2 (two) times daily. as needed for anxiety.     Dispense:  30 tablet     Refill:  2     [unfilled]  No orders of the defined types were placed in this encounter.      "

## 2024-06-04 DIAGNOSIS — Z85.3 HISTORY OF BREAST CANCER: Primary | ICD-10-CM

## 2024-08-06 RX ORDER — ALPRAZOLAM 0.5 MG/1
0.5 TABLET ORAL 2 TIMES DAILY
Qty: 30 TABLET | Refills: 0 | Status: SHIPPED | OUTPATIENT
Start: 2024-08-06

## 2024-08-07 ENCOUNTER — TELEPHONE (OUTPATIENT)
Dept: INTERNAL MEDICINE | Facility: CLINIC | Age: 74
End: 2024-08-07
Payer: MEDICARE

## 2024-08-15 ENCOUNTER — HOSPITAL ENCOUNTER (OUTPATIENT)
Dept: RADIOLOGY | Facility: HOSPITAL | Age: 74
Discharge: HOME | End: 2024-08-15
Attending: INTERNAL MEDICINE
Payer: MEDICARE

## 2024-08-15 DIAGNOSIS — Z12.31 SCREENING MAMMOGRAM FOR HIGH-RISK PATIENT: ICD-10-CM

## 2024-08-15 DIAGNOSIS — Z85.3 HISTORY OF BREAST CANCER: ICD-10-CM

## 2024-08-15 PROCEDURE — 77067 SCR MAMMO BI INCL CAD: CPT | Mod: TC

## 2024-08-23 ENCOUNTER — TELEPHONE (OUTPATIENT)
Dept: HEMATOLOGY/ONCOLOGY | Facility: CLINIC | Age: 74
End: 2024-08-23
Payer: MEDICARE

## 2024-08-23 DIAGNOSIS — C50.911 BILATERAL MALIGNANT NEOPLASM OF BREAST IN FEMALE, UNSPECIFIED ESTROGEN RECEPTOR STATUS, UNSPECIFIED SITE OF BREAST: ICD-10-CM

## 2024-08-23 DIAGNOSIS — C50.912 BILATERAL MALIGNANT NEOPLASM OF BREAST IN FEMALE, UNSPECIFIED ESTROGEN RECEPTOR STATUS, UNSPECIFIED SITE OF BREAST: ICD-10-CM

## 2024-08-23 DIAGNOSIS — Z12.31 SCREENING MAMMOGRAM FOR HIGH-RISK PATIENT: ICD-10-CM

## 2024-08-23 DIAGNOSIS — Z85.3 HISTORY OF BREAST CANCER: Primary | ICD-10-CM

## 2024-09-09 RX ORDER — ALPRAZOLAM 0.5 MG/1
0.5 TABLET ORAL 2 TIMES DAILY
Qty: 30 TABLET | Refills: 0 | Status: SHIPPED | OUTPATIENT
Start: 2024-09-09

## 2024-09-10 ENCOUNTER — TELEPHONE (OUTPATIENT)
Dept: INTERNAL MEDICINE | Facility: CLINIC | Age: 74
End: 2024-09-10
Payer: MEDICARE

## 2024-09-10 NOTE — TELEPHONE ENCOUNTER
----- Message from [facility] sent at 9/10/2024 11:02 AM CDT -----  .Type:  Needs Medical Advice    Who Called:  [employer]    Symptoms (please be specific):  no     How long has patient had these symptoms:   no    Pharmacy name and phone #:   [id number]  [place] - [address]   Phone: [phone]  Fax: [phone]        Would the patient rather a call back or a response via [facility]?      Best Call Back Number:  [phone]    Additional Information:  information to fill her controlled substance HOW LONG HAS PT BEEN A PATIENT? WHEN WAS HER LAST VISIT?      ALPRAZolam (XANAX) 0.5 MG tablet this is the medication in question

## 2024-10-09 ENCOUNTER — LAB VISIT (OUTPATIENT)
Dept: LAB | Facility: HOSPITAL | Age: 74
End: 2024-10-09
Attending: INTERNAL MEDICINE
Payer: MEDICARE

## 2024-10-09 DIAGNOSIS — E78.5 HYPERLIPIDEMIA, UNSPECIFIED HYPERLIPIDEMIA TYPE: ICD-10-CM

## 2024-10-09 DIAGNOSIS — R17 ELEVATED BILIRUBIN: ICD-10-CM

## 2024-10-09 LAB
ALBUMIN SERPL-MCNC: 4.1 G/DL (ref 3.4–4.8)
ALBUMIN/GLOB SERPL: 1.5 RATIO (ref 1.1–2)
ALP SERPL-CCNC: 91 UNIT/L (ref 40–150)
ALT SERPL-CCNC: 21 UNIT/L (ref 0–55)
ANION GAP SERPL CALC-SCNC: 11 MEQ/L
AST SERPL-CCNC: 23 UNIT/L (ref 5–34)
BASOPHILS # BLD AUTO: 0.05 X10(3)/MCL
BASOPHILS NFR BLD AUTO: 0.9 %
BILIRUB SERPL-MCNC: 1.7 MG/DL
BUN SERPL-MCNC: 11.7 MG/DL (ref 9.8–20.1)
CALCIUM SERPL-MCNC: 9.4 MG/DL (ref 8.4–10.2)
CHLORIDE SERPL-SCNC: 105 MMOL/L (ref 98–107)
CHOLEST SERPL-MCNC: 161 MG/DL
CHOLEST/HDLC SERPL: 3 {RATIO} (ref 0–5)
CO2 SERPL-SCNC: 29 MMOL/L (ref 23–31)
CREAT SERPL-MCNC: 0.8 MG/DL (ref 0.55–1.02)
CREAT/UREA NIT SERPL: 15
EOSINOPHIL # BLD AUTO: 0.16 X10(3)/MCL (ref 0–0.9)
EOSINOPHIL NFR BLD AUTO: 2.8 %
ERYTHROCYTE [DISTWIDTH] IN BLOOD BY AUTOMATED COUNT: 13.5 % (ref 11.5–17)
GFR SERPLBLD CREATININE-BSD FMLA CKD-EPI: >60 ML/MIN/1.73/M2
GLOBULIN SER-MCNC: 2.8 GM/DL (ref 2.4–3.5)
GLUCOSE SERPL-MCNC: 94 MG/DL (ref 82–115)
HCT VFR BLD AUTO: 38.8 % (ref 37–47)
HDLC SERPL-MCNC: 62 MG/DL (ref 35–60)
HGB BLD-MCNC: 12.5 G/DL (ref 12–16)
IMM GRANULOCYTES # BLD AUTO: 0.01 X10(3)/MCL (ref 0–0.04)
IMM GRANULOCYTES NFR BLD AUTO: 0.2 %
LDLC SERPL CALC-MCNC: 76 MG/DL (ref 50–140)
LYMPHOCYTES # BLD AUTO: 2.11 X10(3)/MCL (ref 0.6–4.6)
LYMPHOCYTES NFR BLD AUTO: 37.1 %
MCH RBC QN AUTO: 27.5 PG (ref 27–31)
MCHC RBC AUTO-ENTMCNC: 32.2 G/DL (ref 33–36)
MCV RBC AUTO: 85.5 FL (ref 80–94)
MONOCYTES # BLD AUTO: 0.49 X10(3)/MCL (ref 0.1–1.3)
MONOCYTES NFR BLD AUTO: 8.6 %
NEUTROPHILS # BLD AUTO: 2.86 X10(3)/MCL (ref 2.1–9.2)
NEUTROPHILS NFR BLD AUTO: 50.4 %
NRBC BLD AUTO-RTO: 0 %
PLATELET # BLD AUTO: 167 X10(3)/MCL (ref 130–400)
PMV BLD AUTO: 9.1 FL (ref 7.4–10.4)
POTASSIUM SERPL-SCNC: 4.4 MMOL/L (ref 3.5–5.1)
PROT SERPL-MCNC: 6.9 GM/DL (ref 5.8–7.6)
RBC # BLD AUTO: 4.54 X10(6)/MCL (ref 4.2–5.4)
SODIUM SERPL-SCNC: 145 MMOL/L (ref 136–145)
TRIGL SERPL-MCNC: 114 MG/DL (ref 37–140)
VLDLC SERPL CALC-MCNC: 23 MG/DL
WBC # BLD AUTO: 5.68 X10(3)/MCL (ref 4.5–11.5)

## 2024-10-09 PROCEDURE — 36415 COLL VENOUS BLD VENIPUNCTURE: CPT

## 2024-10-09 PROCEDURE — 80061 LIPID PANEL: CPT

## 2024-10-09 PROCEDURE — 80053 COMPREHEN METABOLIC PANEL: CPT

## 2024-10-09 PROCEDURE — 85025 COMPLETE CBC W/AUTO DIFF WBC: CPT

## 2024-10-17 ENCOUNTER — OFFICE VISIT (OUTPATIENT)
Dept: INTERNAL MEDICINE | Facility: CLINIC | Age: 74
End: 2024-10-17
Payer: MEDICARE

## 2024-10-17 VITALS
TEMPERATURE: 97 F | BODY MASS INDEX: 36.07 KG/M2 | OXYGEN SATURATION: 97 % | HEART RATE: 93 BPM | SYSTOLIC BLOOD PRESSURE: 118 MMHG | WEIGHT: 196 LBS | RESPIRATION RATE: 16 BRPM | DIASTOLIC BLOOD PRESSURE: 70 MMHG | HEIGHT: 62 IN

## 2024-10-17 DIAGNOSIS — Z23 FLU VACCINE NEED: ICD-10-CM

## 2024-10-17 DIAGNOSIS — E78.5 HYPERLIPIDEMIA, UNSPECIFIED HYPERLIPIDEMIA TYPE: Primary | ICD-10-CM

## 2024-10-17 DIAGNOSIS — Z00.00 ENCOUNTER FOR MEDICARE ANNUAL WELLNESS EXAM: ICD-10-CM

## 2024-10-17 DIAGNOSIS — F41.1 GENERALIZED ANXIETY DISORDER: ICD-10-CM

## 2024-10-17 RX ORDER — ALPRAZOLAM 0.5 MG/1
0.5 TABLET ORAL 2 TIMES DAILY
Qty: 30 TABLET | Refills: 5 | Status: SHIPPED | OUTPATIENT
Start: 2024-10-17

## 2024-10-17 RX ORDER — ATORVASTATIN CALCIUM 20 MG/1
20 TABLET, FILM COATED ORAL NIGHTLY
Qty: 90 TABLET | Refills: 3 | Status: SHIPPED | OUTPATIENT
Start: 2024-10-17

## 2024-10-17 NOTE — ASSESSMENT & PLAN NOTE
I encouarged her to consider CBT or a chronic med that isn't controlled or sedating.  I wrote the names down for her to do more research.  For now, I refilled her xanax.

## 2024-10-17 NOTE — ASSESSMENT & PLAN NOTE
Health Maintenance Due   Topic Date Due    TETANUS VACCINE  09/01/2015    Influenza Vaccine (1) 09/01/2024    COVID-19 Vaccine (5 - 2024-25 season) 09/01/2024     Flu shot today.  Recent labs reviewed and discussed.  The covid booster and td also recommended.  Advance Care Planning     Date: 10/17/2024  Patient did not wish or was not able to name a surrogate decision maker or provide an Advance Care Plan.She is already working on that.

## 2024-10-17 NOTE — PROGRESS NOTES
Subjective:        Patient Care Team:  Nilam Tavares MD as PCP - General (Internal Medicine)     Chief Complaint: Medicare AWV      HPI:She is here for a medicare wellness.  No complaints.  The RLS is an intermittent issues.  She has to take a xanax every time she leaves the house. She will try to take a half or just over a half.    Problem Noted   Encounter for Medicare Annual Wellness Exam 8/4/2022   Elevated Bilirubin 8/10/2023   Generalized Anxiety Disorder 8/4/2022   History of Breast Cancer 8/4/2022    followed by Dr. Graham     Hyperlipidemia 8/4/2022   Obesity 8/4/2022   Osteoarthritis of Left Knee 8/4/2022   Osteopenia 8/4/2022   Restless Legs Syndrome 8/4/2022   Overactive Bladder 8/4/2022        The patient's Health Maintenance was reviewed and the following appears to be due:   Health Maintenance Due   Topic Date Due    TETANUS VACCINE  09/01/2015    Influenza Vaccine (1) 09/01/2024    COVID-19 Vaccine (5 - 2024-25 season) 09/01/2024       Problem List  Active Problem List with Overview Notes    Diagnosis Date Noted    Encounter for Medicare annual wellness exam 08/04/2022    Elevated bilirubin 08/10/2023    Generalized anxiety disorder 08/04/2022    History of breast cancer 08/04/2022     followed by Dr. Graham      Hyperlipidemia 08/04/2022    Obesity 08/04/2022    Osteoarthritis of left knee 08/04/2022    Osteopenia 08/04/2022    Restless legs syndrome 08/04/2022    Overactive bladder 08/04/2022       Past Medical History:  Past Medical History:   Diagnosis Date    Breast cancer     Depression     H/O female dyspareunia     Personal history of colonic polyps     Restless leg syndrome      Past Surgical History:   Procedure Laterality Date    BREAST LUMPECTOMY      BREAST RECONSTRUCTION      COLONOSCOPY  05/16/2018    MASTECTOMY      WISDOM TOOTH EXTRACTION       Review of patient's allergies indicates:   Allergen Reactions    Ciprofloxacin     Poison ivy extract      Current Outpatient Medications  on File Prior to Visit   Medication Sig Dispense Refill    betamethasone valerate 0.1% (VALISONE) 0.1 % Crea Apply topically 3 (three) times daily as needed.      [DISCONTINUED] ALPRAZolam (XANAX) 0.5 MG tablet Take 1 tablet by mouth twice daily as needed for anxiety 30 tablet 0    [DISCONTINUED] atorvastatin (LIPITOR) 20 MG tablet TAKE 1 TABLET BY MOUTH ONCE DAILY IN THE EVENING 90 tablet 3     No current facility-administered medications on file prior to visit.     Social History     Socioeconomic History    Marital status:    Tobacco Use    Smoking status: Never    Smokeless tobacco: Never   Substance and Sexual Activity    Alcohol use: Not Currently    Drug use: Never     Social Drivers of Health     Financial Resource Strain: Low Risk  (8/10/2023)    Overall Financial Resource Strain (CARDIA)     Difficulty of Paying Living Expenses: Not hard at all   Food Insecurity: No Food Insecurity (8/10/2023)    Hunger Vital Sign     Worried About Running Out of Food in the Last Year: Never true     Ran Out of Food in the Last Year: Never true   Transportation Needs: No Transportation Needs (8/10/2023)    PRAPARE - Transportation     Lack of Transportation (Medical): No     Lack of Transportation (Non-Medical): No   Physical Activity: Insufficiently Active (8/10/2023)    Exercise Vital Sign     Days of Exercise per Week: 3 days     Minutes of Exercise per Session: 30 min   Stress: No Stress Concern Present (8/10/2023)    [employer] - Occupational Stress Questionnaire     Feeling of Stress : Not at all   Housing Stability: Low Risk  (10/17/2024)    Housing Stability Vital Sign     Unable to Pay for Housing in the Last Year: No     Homeless in the Last Year: No     Family History   Problem Relation Name Age of Onset    Alzheimer's disease Mother      Hypertension Father      Lymphoma Father      Cancer Sister      Breast cancer Maternal Aunt         Review of Systems        Objective:  "  /70 (BP Location: Left arm, Patient Position: Sitting)   Pulse 93   Temp 97 °F (36.1 °C)   Resp 16   Ht 5' 2.01" (1.575 m)   Wt 88.9 kg (196 lb)   SpO2 97%   BMI 35.84 kg/m²     Physical Exam  Constitutional:       General: She is not in acute distress.     Appearance: Normal appearance.   HENT:      Head: Normocephalic and atraumatic.   Eyes:      General: No scleral icterus.     Conjunctiva/sclera: Conjunctivae normal.   Neck:      Vascular: No carotid bruit.   Cardiovascular:      Rate and Rhythm: Normal rate and regular rhythm.      Pulses: Normal pulses.      Heart sounds: Normal heart sounds. No murmur heard.     No friction rub. No gallop.   Pulmonary:      Effort: Pulmonary effort is normal.      Breath sounds: Normal breath sounds.   Abdominal:      General: Bowel sounds are normal.      Palpations: Abdomen is soft. There is no mass.      Tenderness: There is no abdominal tenderness. There is no guarding or rebound.   Musculoskeletal:         General: No deformity.      Cervical back: No rigidity or tenderness.      Right lower leg: No edema.      Left lower leg: No edema.   Lymphadenopathy:      Cervical: No cervical adenopathy.   Skin:     Coloration: Skin is not jaundiced or pale.      Findings: No erythema.   Neurological:      General: No focal deficit present.      Mental Status: She is alert and oriented to person, place, and time.      Gait: Gait normal.   Psychiatric:         Mood and Affect: Mood normal.         Behavior: Behavior normal.         Thought Content: Thought content normal.         Judgment: Judgment normal.         Assessment and Plan:     Encounter for Medicare annual wellness exam  Health Maintenance Due   Topic Date Due    TETANUS VACCINE  09/01/2015    Influenza Vaccine (1) 09/01/2024    COVID-19 Vaccine (5 - 2024-25 season) 09/01/2024     Flu shot today.  Recent labs reviewed and discussed.  The covid booster and td also recommended.  Advance Care " Planning    Date: 10/17/2024  Patient did not wish or was not able to name a surrogate decision maker or provide an Advance Care Plan.She is already working on that.         Hyperlipidemia  Stable, continue lipitor.    Generalized anxiety disorder  I encouarged her to consider CBT or a chronic med that isn't controlled or sedating.  I wrote the names down for her to do more research.  For now, I refilled her xanax.     Follow up in about 1 year (around 10/17/2025).    Medications Ordered This Encounter   Medications    ALPRAZolam (XANAX) 0.5 MG tablet     Sig: Take 1 tablet (0.5 mg total) by mouth 2 (two) times daily. as needed for anxiety.     Dispense:  30 tablet     Refill:  5    atorvastatin (LIPITOR) 20 MG tablet     Sig: Take 1 tablet (20 mg total) by mouth every evening.     Dispense:  90 tablet     Refill:  3     [unfilled]  No orders of the defined types were placed in this encounter.        A comprehensive HEALTH RISK ASSESSMENT was completed today. Results are summarized below:    There are NO EMOTIONAL/SOCIAL CONCERNS identified on today's screening for Social Isolation, Depression and Anxiety.    There are NO COGNITIVE FUNCTION CONCERNS identified on today's screening.  There are NO FUNCTIONAL OR SAFETY CONCERNS were identified on today's screening for Physical Symptoms, Nutritional, Cognitive Function, Home Safety/Living Situation, Fall Risk, Activities of Daily Living, Independent Activities of Daily Living, Physical Activity, Timed Up and Go test and Whisper test.   The patient reports NO OPIOID PRESCRIPTIONS. This was confirmed through medication reconciliation and the [facility] website.    The patient is NOT A TOBACCO USER.        All Questions regarding food, transportation or housing were not answered today.    Follow up in about 1 year (around 10/17/2025). In addition to their scheduled follow up, the patient has also been instructed to follow up on as needed basis.      Additional Notes: Pain 0/10 Quality 110: Preventive Care And Screening: Influenza Immunization: Influenza immunization was not ordered or administered, reason not given Quality 474: Zoster Vaccination Status: Shingrix Vaccination Administered or Previously Received Detail Level: Detailed Quality 131: Pain Assessment And Follow-Up: Pain assessment using a standardized tool is documented as negative, no follow-up plan required Quality 130: Documentation Of Current Medications In The Medical Record: Current Medications Documented

## 2025-02-11 RX ORDER — ALPRAZOLAM 0.5 MG/1
0.5 TABLET ORAL 2 TIMES DAILY
Qty: 30 TABLET | Refills: 5 | OUTPATIENT
Start: 2025-02-11

## 2025-02-11 NOTE — TELEPHONE ENCOUNTER
Patient notified that she should still have refills. She said she did not know the rx number so she wasn't able to request the refill from [facility]. I told her she should be able to call and speak to the associate at the pharmacy and they should be able to pull it up. She verbalized understanding.

## 2025-04-11 NOTE — PROGRESS NOTES
HEMATOLOGY/ONCOLOGY OFFICE CLINIC VISIT    Visit Information:    Initial Evaluation: 11/8/2005  Referring Provider:   Other providers:  Code status: Not addressed    Diagnosis:   1) Stage I left breast cancer diagnosed in 2004   - Left: lumpectomy 4/20/04 followed by XRT and Arimidex [id number]--> Evista x 3 yrs  2) Stage II right breast cancer diagnosed in 9/1989   - Right: Mastectomy followed by CAF x 6 in 1989    Present Treatment:  Surveillance    Imaging:  Mammogram 8/16/21: IMPRESSION: BENIGN There is no mammographic evidence of malignancy.  RECOMMENDATIONS:  A routine screening mammogram in one year in the absence of significant clinical findings in the interval is recommended (August 2022).    Mammogram 8/18/2022: IMPRESSION: BENIGN There is no mammographic evidence of malignancy. RECOMMENDATIONS: A routine screening mammogram in one year in the absence of significant clinical findings in the interval is recommended (August 2023).    Mammogram 8/23/2023: IMPRESSION: BENIGN  No mammographic evidence of malignancy.  RECOMMENDATIONS:   Recommend continued annual screening mammography with tomosynthesis. Next exam is due in August, 2024 unless clinical signs or symptoms warrant earlier evaluation.  Recommend annual supplemental breast screening with dynamic contrast enhanced breast MRI in this patient with a personal history of breast cancer diagnosed prior to age 50. Ideally, mammography and breast MRI are alternated every 6 months. To establish such a schedule, breast MRI would be due February, 2024.  MRI Breast 2/29/2024:  IMPRESSION: BENIGN   1) No MRI evidence of malignancy in either breast.   2) Three subcentimeter T2 bright circumscribed hepatic lesions in the right lobe of the liver, nonspecific.   RECOMMENDATIONS: Recommend continued annual screening mammography with tomosynthesis. Next exam is due in August, 2024 unless clinical signs or symptoms warrant earlier evaluation.  Recommend annual  supplemental breast screening with dynamic contrast enhanced breast MRI in this patient with a personal history of bilateral breast cancers, one of which was diagnosed prior to age 50.  Next exam is due in February, 2025.  Given the patient's history of primary breast carcinoma, recommend further hepatic imaging with multiphasic cross-sectional imaging (CT or MRI) utilizing a liver mass protocol.  Mammogram 08/16/2024: IMPRESSION: BENIGN No mammographic evidence of malignancy. RECOMMENDATIONS:  Recommend continued annual left screening mammography with tomosynthesis. Next exam is due in August, 2025 unless clinical signs or symptoms warrant earlier evaluation.  Of note, as the patient has had a right simple mastectomy, continued mammography of the right breast is not necessary; however, if the patient has a strong preference for continued right screening mammography, she may be obliged.  Recommend continued annual supplemental breast screening with dynamic contrast enhanced breast MRI in this patient with a personal history of bilateral breast cancers, 1 of which was diagnosed prior to age 50.  Next exam is due in February, 2025.    MRI Breast 03/28/25: IMPRESSION: BENIGN 1.  No MR evidence of malignancy in either breast. 2.  Stable post surgical changes related to prior right mastectomy and tram flap reconstruction. 3.  Stable post lumpectomy and post radiation changes of the left breast. RECOMMENDATIONS: Continued annual mammography and supplemental breast screening with dynamic contrast enhanced breast MRI is recommended, preferably alternating mammography and MRI every 6 months.  - Routine annual screening mammography is due in 5 months unless clinical signs or symptoms warrant earlier evaluation (August 2025).      Abdominal US 3/12/2024:   Two small echogenic lesions in the liver, most likely small hemangiomas.  No acute abdominal pathology identified.   Minimal gallbladder sludge at the fundus.    "    Pathology:  Not available    CLINICAL HISTORY:       Patient: Ms Clarke was diagnosed with right breast cancer in September of 1989 for which she underwent right mastectomy in 9/1989 for at least stage II disease and took chemotherapy with CAF x 6 in [place], [place]. She developed Stage I left breast cancer in 2004 for which she had a lumpectomy 4/20/04 and radiation followed by Arimidex x 5 years and completed it 2009 and then she was placed on Evista. I saw her for the first time on 11/8/2005 after moving from [place] following [place]. Medical records not accessible.     She has a colonoscopy back in 3/12/13 she is s/p polypectomy and pathology report revealed CECAL POLYP, BIOPSY: TUBULAR ADENOMA, SMALL FRAGMENT;  COLON;  NO EVIDENCE OF HIGH GRADE DYSPLASIA.  It will repeat in 3 year     During previous visits: She stated that she has been having pain in her legs at night time and she associated it with Requip that she just started for her restless leg syndrome. It also started when she increased the medication. She stopped the Requip already. She denies any swelling of her legs or any pain during the day time.   She lost weight intentionally with weight watchers, but has gain 20 lbs back. She told me that she was anxious and on 11/20/13 she [facility] Dr [facility] As per Dr Gomez note: The patient states that she has been seeing advertisements for various cancer centers that suggest a "team approach."  She states that she has never had "a team."  She reported to Dr. Estes that she has been having some difficulty with "hoarding" and it was suggested that the patient talk with Dr Gomez. She engages in hoarding behavior that is interfering with her relationship with her . Medical psychology has been consulted. She stated that she is doing better now. She has not been seen by Dr Gomez anymore and she is declining further visits with him at this time.    She is struggling with her " weight and has been on different medications to see if this helps her. No weight loss with the Contrave.  She looked into lap band, but doesn't qualify.  She quit taking the Contrave. She then tried another prescription medication but too expensive therefore she did not take,Qsymia.       Chief Complaint: Results (MMG Results.)      Interval History:    04/16/25: Patient presents today for follow up to discuss MRI Breast. MRI Breast 3/28/25 - BENIGN; due for MMG in 5 months (08/2025). She is doing well. Pt does not want to continue with MRI Breast, she has agreed to continue MMG that will be ordered by Ob/GYN.       ROS: All 14 points ROS taken and positive as per Interval History, all other negative.  Review of Systems   Constitutional:  Negative for chills, fever and weight loss.   HENT:  Negative for congestion and nosebleeds.    Eyes:  Negative for blurred vision, double vision and photophobia.   Respiratory:  Negative for cough, hemoptysis and shortness of breath.    Cardiovascular:  Negative for chest pain, palpitations, leg swelling and PND.   Gastrointestinal:  Negative for abdominal pain, blood in stool, constipation, diarrhea, melena, nausea and vomiting.   Genitourinary:  Negative for dysuria, frequency, hematuria and urgency.   Musculoskeletal:  Negative for back pain, falls and myalgias.   Skin:  Negative for itching and rash.   Neurological:  Negative for tremors, focal weakness, seizures, weakness and headaches.   Endo/Heme/Allergies:  Negative for environmental allergies. Does not bruise/bleed easily.   Psychiatric/Behavioral:  Positive for depression. Negative for suicidal ideas. The patient is nervous/anxious.          Histories:  PMH/PSH/FH/SOCIAL/ALLERGIES AND MEDS REVIEWED AND UPDATED AS APPROPRIATE       Vitals:    04/16/25 1209   BP: 132/88   BP Location: Left arm   Patient Position: Sitting   Pulse: 69   Resp: 18   Temp: 98.3 °F (36.8 °C)   TempSrc: Oral   SpO2: 95%   Weight: 88.9 kg (195 lb  "14.4 oz)   Height: 5' 2.01" (1.575 m)              Physical Exam  Vitals and nursing note reviewed.   Constitutional:       General: She is not in acute distress.     Appearance: Normal appearance. She is well-developed.   HENT:      Head: Normocephalic and atraumatic.      Mouth/Throat:      Mouth: Mucous membranes are moist.   Eyes:      General: No scleral icterus.     Extraocular Movements: Extraocular movements intact.      Conjunctiva/sclera: Conjunctivae normal.      Pupils: Pupils are equal, round, and reactive to light.   Neck:      Vascular: No JVD.   Cardiovascular:      Rate and Rhythm: Normal rate and regular rhythm.      Heart sounds: No murmur heard.  Pulmonary:      Effort: Pulmonary effort is normal.      Breath sounds: Normal breath sounds. No wheezing or rhonchi.   Chest:      Chest wall: No deformity or tenderness.   Breasts:     Right: Absent.      Left: Normal. No swelling, mass, nipple discharge, skin change or tenderness.      Comments: Right reconstructed breast  Left lumpectomy site well healed  Abdominal:      General: Bowel sounds are normal. There is no distension.      Palpations: Abdomen is soft. There is no mass.      Tenderness: There is no abdominal tenderness.   Musculoskeletal:         General: No swelling or deformity.      Cervical back: Neck supple.   Lymphadenopathy:      Cervical: No cervical adenopathy.      Upper Body:      Right upper body: No supraclavicular or axillary adenopathy.      Left upper body: No supraclavicular or axillary adenopathy.      Lower Body: No right inguinal adenopathy. No left inguinal adenopathy.   Skin:     General: Skin is warm.      Coloration: Skin is not jaundiced.      Findings: No lesion or rash.      Nails: There is no clubbing.   Neurological:      General: No focal deficit present.      Mental Status: She is alert and oriented to person, place, and time.      Sensory: Sensation is intact.      Motor: Motor function is intact.      Gait: " Gait is intact.   Psychiatric:         Attention and Perception: Attention normal.         Mood and Affect: Mood and affect normal.         Speech: Speech normal.         Behavior: Behavior is cooperative.         Thought Content: Thought content normal.         Cognition and Memory: Cognition normal.         Judgment: Judgment normal.       Laboratory:  CBC with Differential:  Lab Results   Component Value Date    WBC 5.68 10/09/2024    RBC 4.54 10/09/2024    HGB 12.5 10/09/2024    HCT 38.8 10/09/2024    MCV 85.5 10/09/2024    MCH 27.5 10/09/2024    MCHC 32.2 (L) 10/09/2024    RDW 13.5 10/09/2024     10/09/2024    MPV 9.1 10/09/2024        CMP:  Sodium   Date Value Ref Range Status   10/09/2024 145 136 - 145 mmol/L Final     Potassium   Date Value Ref Range Status   10/09/2024 4.4 3.5 - 5.1 mmol/L Final     Chloride   Date Value Ref Range Status   10/09/2024 105 98 - 107 mmol/L Final     CO2   Date Value Ref Range Status   10/09/2024 29 23 - 31 mmol/L Final     Blood Urea Nitrogen   Date Value Ref Range Status   10/09/2024 11.7 9.8 - 20.1 mg/dL Final     Creatinine   Date Value Ref Range Status   10/09/2024 0.80 0.55 - 1.02 mg/dL Final     Calcium   Date Value Ref Range Status   10/09/2024 9.4 8.4 - 10.2 mg/dL Final     Albumin   Date Value Ref Range Status   10/09/2024 4.1 3.4 - 4.8 g/dL Final     Bilirubin Total   Date Value Ref Range Status   10/09/2024 1.7 (H) <=1.5 mg/dL Final     ALP   Date Value Ref Range Status   10/09/2024 91 40 - 150 unit/L Final     AST   Date Value Ref Range Status   10/09/2024 23 5 - 34 unit/L Final     ALT   Date Value Ref Range Status   10/09/2024 21 0 - 55 unit/L Final     Estimated GFR-Non    Date Value Ref Range Status   07/29/2022 >60 mls/min/1.73/m2 Final         Assessment:       Bilateral breast cancer:  1) Stage I left breast cancer diagnosed in 2004   - Left: lumpectomy 4/20/04 followed by XRT and Arimidex [id number]--> Evista x 3 yrs  2) Stage II right  breast cancer diagnosed in 9/1989   - Right: Mastectomy followed by CAF x 6 in 1989  3) Liver lesions seen on Breast MRI  ---US abdomen c/w hemangiomas      Plan:       Patient with no clinical evidence of disease for 21 years.  Bone density on 3/7/24 showed Osteoporosis  - followed by PCP  Screening Mammogram due 08/2025 - deferred to Ob/Gyn  MRI breast - pt no longer wants to continue   RTC PRN    Instructed to call for any questions or problems  The patient was given ample opportunity to ask questions and they were all answered to satisfaction; patient demonstrated understanding of what we discussed and is agreeable to the plan.    Ena Graham MD  Hematology/Oncology      Professional Services   I, Daksha Sherwood LPN, acted solely as a scribe for and in the presence of Dr. Ena Graham, who performed these services.

## 2025-04-16 ENCOUNTER — OFFICE VISIT (OUTPATIENT)
Dept: HEMATOLOGY/ONCOLOGY | Facility: CLINIC | Age: 75
End: 2025-04-16
Payer: MEDICARE

## 2025-04-16 VITALS
WEIGHT: 195.88 LBS | BODY MASS INDEX: 36.05 KG/M2 | HEART RATE: 69 BPM | DIASTOLIC BLOOD PRESSURE: 88 MMHG | OXYGEN SATURATION: 95 % | TEMPERATURE: 98 F | RESPIRATION RATE: 18 BRPM | SYSTOLIC BLOOD PRESSURE: 132 MMHG | HEIGHT: 62 IN

## 2025-04-16 DIAGNOSIS — C50.912 BILATERAL MALIGNANT NEOPLASM OF BREAST IN FEMALE, UNSPECIFIED ESTROGEN RECEPTOR STATUS, UNSPECIFIED SITE OF BREAST: ICD-10-CM

## 2025-04-16 DIAGNOSIS — C50.911 BILATERAL MALIGNANT NEOPLASM OF BREAST IN FEMALE, UNSPECIFIED ESTROGEN RECEPTOR STATUS, UNSPECIFIED SITE OF BREAST: ICD-10-CM

## 2025-04-16 DIAGNOSIS — Z85.3 HISTORY OF BREAST CANCER: Primary | ICD-10-CM

## 2025-04-16 PROCEDURE — 99999 PR PBB SHADOW E&M-EST. PATIENT-LVL III: CPT | Mod: PBBFAC,,, | Performed by: INTERNAL MEDICINE

## 2025-04-16 PROCEDURE — 99213 OFFICE O/P EST LOW 20 MIN: CPT | Mod: PBBFAC | Performed by: INTERNAL MEDICINE

## 2025-04-22 DIAGNOSIS — F41.1 GENERALIZED ANXIETY DISORDER: Primary | ICD-10-CM

## 2025-04-28 RX ORDER — ALPRAZOLAM 0.5 MG/1
0.5 TABLET ORAL 2 TIMES DAILY
Qty: 30 TABLET | Refills: 0 | Status: SHIPPED | OUTPATIENT
Start: 2025-04-28

## 2025-06-29 DIAGNOSIS — F41.1 GENERALIZED ANXIETY DISORDER: ICD-10-CM

## 2025-07-01 RX ORDER — ALPRAZOLAM 0.5 MG/1
0.5 TABLET ORAL 2 TIMES DAILY
Qty: 30 TABLET | Refills: 0 | Status: SHIPPED | OUTPATIENT
Start: 2025-07-01

## 2025-08-21 ENCOUNTER — HOSPITAL ENCOUNTER (OUTPATIENT)
Dept: RADIOLOGY | Facility: HOSPITAL | Age: 75
Discharge: HOME | End: 2025-08-21
Attending: OBSTETRICS & GYNECOLOGY
Payer: MEDICARE

## 2025-08-21 DIAGNOSIS — F41.1 GENERALIZED ANXIETY DISORDER: ICD-10-CM

## 2025-08-21 DIAGNOSIS — Z12.31 OTHER SCREENING MAMMOGRAM: ICD-10-CM

## 2025-08-21 PROCEDURE — 77063 BREAST TOMOSYNTHESIS BI: CPT | Mod: TC

## 2025-08-22 RX ORDER — ALPRAZOLAM 0.5 MG/1
0.5 TABLET ORAL 2 TIMES DAILY
Qty: 30 TABLET | Refills: 0 | Status: SHIPPED | OUTPATIENT
Start: 2025-08-22